# Patient Record
Sex: FEMALE | Race: WHITE | NOT HISPANIC OR LATINO | RURAL
[De-identification: names, ages, dates, MRNs, and addresses within clinical notes are randomized per-mention and may not be internally consistent; named-entity substitution may affect disease eponyms.]

---

## 2017-05-26 ENCOUNTER — TRANSCRIBE ORDERS (OUTPATIENT)
Dept: LAB | Facility: CLINIC | Age: 42
End: 2017-05-26

## 2017-05-26 DIAGNOSIS — E34.9 UNSPECIFIED ENDOCRINE DISORDER: ICD-10-CM

## 2017-05-26 DIAGNOSIS — E11.01 DIABETES MELLITUS TYPE 2 WITH HYPEROSMOLAR COMA, UNCONTROLLED, UNSPECIFIED LONG TERM INSULIN USE STATUS: ICD-10-CM

## 2017-05-26 DIAGNOSIS — R61 GENERALIZED HYPERHIDROSIS: Primary | ICD-10-CM

## 2017-05-30 ENCOUNTER — LAB (OUTPATIENT)
Dept: LAB | Facility: CLINIC | Age: 42
End: 2017-05-30

## 2017-05-30 DIAGNOSIS — E06.3 HASHIMOTO'S THYROIDITIS: ICD-10-CM

## 2017-05-30 DIAGNOSIS — E55.9 VITAMIN D DEFICIENCY: ICD-10-CM

## 2017-05-30 DIAGNOSIS — E34.9 UNSPECIFIED ENDOCRINE DISORDER: ICD-10-CM

## 2017-05-30 DIAGNOSIS — E06.3 HASHIMOTO'S DISEASE: ICD-10-CM

## 2017-05-30 DIAGNOSIS — E11.01 DIABETES MELLITUS TYPE 2 WITH HYPEROSMOLAR COMA, UNCONTROLLED, UNSPECIFIED LONG TERM INSULIN USE STATUS: ICD-10-CM

## 2017-05-30 DIAGNOSIS — R61 GENERALIZED HYPERHIDROSIS: ICD-10-CM

## 2017-05-30 LAB
25(OH)D3 SERPL-MCNC: 48.2 NG/ML (ref 30–100)
ALBUMIN SERPL-MCNC: 4 G/DL (ref 3.4–4.8)
ALBUMIN/GLOB SERPL: 1.2 G/DL (ref 1.1–1.8)
ALP SERPL-CCNC: 84 U/L (ref 38–126)
ALT SERPL W P-5'-P-CCNC: 33 U/L (ref 9–52)
ANION GAP SERPL CALCULATED.3IONS-SCNC: 11 MMOL/L (ref 5–15)
ARTICHOKE IGE QN: 144 MG/DL (ref 1–129)
AST SERPL-CCNC: 19 U/L (ref 14–36)
BASOPHILS # BLD AUTO: 0.04 10*3/MM3 (ref 0–0.2)
BASOPHILS NFR BLD AUTO: 0.4 % (ref 0–2)
BILIRUB SERPL-MCNC: 0.4 MG/DL (ref 0.2–1.3)
BUN BLD-MCNC: 18 MG/DL (ref 7–21)
BUN/CREAT SERPL: 31.6 (ref 7–25)
CALCIUM SPEC-SCNC: 9.1 MG/DL (ref 8.4–10.2)
CHLORIDE SERPL-SCNC: 100 MMOL/L (ref 95–110)
CHOLEST SERPL-MCNC: 185 MG/DL (ref 0–199)
CO2 SERPL-SCNC: 27 MMOL/L (ref 22–31)
CORTIS SERPL-MCNC: 6.05 MCG/DL (ref 4.46–22.7)
CREAT BLD-MCNC: 0.57 MG/DL (ref 0.5–1)
CRP SERPL-MCNC: 2 MG/DL (ref 0–1)
DEPRECATED RDW RBC AUTO: 43.9 FL (ref 36.4–46.3)
EOSINOPHIL # BLD AUTO: 0.29 10*3/MM3 (ref 0–0.7)
EOSINOPHIL NFR BLD AUTO: 3 % (ref 0–7)
ERYTHROCYTE [DISTWIDTH] IN BLOOD BY AUTOMATED COUNT: 14.7 % (ref 11.5–14.5)
FERRITIN SERPL-MCNC: 27.2 NG/ML (ref 6.2–137)
FOLATE SERPL-MCNC: 8.56 NG/ML (ref 2.76–21)
GFR SERPL CREATININE-BSD FRML MDRD: 117 ML/MIN/1.73 (ref 58–135)
GLOBULIN UR ELPH-MCNC: 3.4 GM/DL (ref 2.3–3.5)
GLUCOSE BLD-MCNC: 91 MG/DL (ref 60–100)
HBA1C MFR BLD: 5.77 % (ref 4–5.6)
HCT VFR BLD AUTO: 36.3 % (ref 35–45)
HDLC SERPL-MCNC: 33 MG/DL (ref 60–200)
HGB BLD-MCNC: 11.9 G/DL (ref 12–15.5)
IMM GRANULOCYTES # BLD: 0.01 10*3/MM3 (ref 0–0.02)
IMM GRANULOCYTES NFR BLD: 0.1 % (ref 0–0.5)
LDLC/HDLC SERPL: 4.13 {RATIO} (ref 0–3.22)
LITHIUM SERPL-SCNC: <0.2 MMOL/L (ref 0.6–1.2)
LYMPHOCYTES # BLD AUTO: 2.25 10*3/MM3 (ref 0.6–4.2)
LYMPHOCYTES NFR BLD AUTO: 23.2 % (ref 10–50)
MCH RBC QN AUTO: 26.9 PG (ref 26.5–34)
MCHC RBC AUTO-ENTMCNC: 32.8 G/DL (ref 31.4–36)
MCV RBC AUTO: 82.1 FL (ref 80–98)
MONOCYTES # BLD AUTO: 0.52 10*3/MM3 (ref 0–0.9)
MONOCYTES NFR BLD AUTO: 5.4 % (ref 0–12)
NEUTROPHILS # BLD AUTO: 6.57 10*3/MM3 (ref 2–8.6)
NEUTROPHILS NFR BLD AUTO: 67.9 % (ref 37–80)
PLATELET # BLD AUTO: 274 10*3/MM3 (ref 150–450)
PMV BLD AUTO: 12 FL (ref 8–12)
POTASSIUM BLD-SCNC: 4.2 MMOL/L (ref 3.5–5.1)
PROT SERPL-MCNC: 7.4 G/DL (ref 6.3–8.6)
RBC # BLD AUTO: 4.42 10*6/MM3 (ref 3.77–5.16)
SODIUM BLD-SCNC: 138 MMOL/L (ref 137–145)
T3 SERPL-MCNC: 290 NG/DL (ref 97–169)
T4 FREE SERPL-MCNC: 0.97 NG/DL (ref 0.78–2.19)
TRIGL SERPL-MCNC: 79 MG/DL (ref 20–199)
TSH SERPL DL<=0.05 MIU/L-ACNC: 0.06 MIU/ML (ref 0.46–4.68)
VIT B12 BLD-MCNC: 533 PG/ML (ref 239–931)
WBC NRBC COR # BLD: 9.68 10*3/MM3 (ref 3.2–9.8)

## 2017-05-30 PROCEDURE — 86644 CMV ANTIBODY: CPT | Performed by: NURSE PRACTITIONER

## 2017-05-30 PROCEDURE — 86140 C-REACTIVE PROTEIN: CPT | Performed by: NURSE PRACTITIONER

## 2017-05-30 PROCEDURE — 84436 ASSAY OF TOTAL THYROXINE: CPT | Performed by: CHIROPRACTOR

## 2017-05-30 PROCEDURE — 82978 ASSAY OF GLUTATHIONE: CPT | Performed by: CHIROPRACTOR

## 2017-05-30 PROCEDURE — 84479 ASSAY OF THYROID (T3 OR T4): CPT | Performed by: CHIROPRACTOR

## 2017-05-30 PROCEDURE — 86355 B CELLS TOTAL COUNT: CPT | Performed by: CHIROPRACTOR

## 2017-05-30 PROCEDURE — 84590 ASSAY OF VITAMIN A: CPT | Performed by: CHIROPRACTOR

## 2017-05-30 PROCEDURE — 84443 ASSAY THYROID STIM HORMONE: CPT | Performed by: NURSE PRACTITIONER

## 2017-05-30 PROCEDURE — 84999 UNLISTED CHEMISTRY PROCEDURE: CPT | Performed by: CHIROPRACTOR

## 2017-05-30 PROCEDURE — 86696 HERPES SIMPLEX TYPE 2 TEST: CPT | Performed by: CHIROPRACTOR

## 2017-05-30 PROCEDURE — 82728 ASSAY OF FERRITIN: CPT | Performed by: NURSE PRACTITIONER

## 2017-05-30 PROCEDURE — 80061 LIPID PANEL: CPT | Performed by: NURSE PRACTITIONER

## 2017-05-30 PROCEDURE — 83520 IMMUNOASSAY QUANT NOS NONAB: CPT | Performed by: NURSE PRACTITIONER

## 2017-05-30 PROCEDURE — 82306 VITAMIN D 25 HYDROXY: CPT | Performed by: NURSE PRACTITIONER

## 2017-05-30 PROCEDURE — 83036 HEMOGLOBIN GLYCOSYLATED A1C: CPT | Performed by: CHIROPRACTOR

## 2017-05-30 PROCEDURE — 86360 T CELL ABSOLUTE COUNT/RATIO: CPT | Performed by: CHIROPRACTOR

## 2017-05-30 PROCEDURE — 82607 VITAMIN B-12: CPT | Performed by: NURSE PRACTITIONER

## 2017-05-30 PROCEDURE — 84270 ASSAY OF SEX HORMONE GLOBUL: CPT | Performed by: NURSE PRACTITIONER

## 2017-05-30 PROCEDURE — 84480 ASSAY TRIIODOTHYRONINE (T3): CPT | Performed by: NURSE PRACTITIONER

## 2017-05-30 PROCEDURE — 82670 ASSAY OF TOTAL ESTRADIOL: CPT | Performed by: CHIROPRACTOR

## 2017-05-30 PROCEDURE — 83525 ASSAY OF INSULIN: CPT | Performed by: NURSE PRACTITIONER

## 2017-05-30 PROCEDURE — 86376 MICROSOMAL ANTIBODY EACH: CPT | Performed by: NURSE PRACTITIONER

## 2017-05-30 PROCEDURE — 84439 ASSAY OF FREE THYROXINE: CPT | Performed by: NURSE PRACTITIONER

## 2017-05-30 PROCEDURE — 82627 DEHYDROEPIANDROSTERONE: CPT | Performed by: NURSE PRACTITIONER

## 2017-05-30 PROCEDURE — 86695 HERPES SIMPLEX TYPE 1 TEST: CPT | Performed by: CHIROPRACTOR

## 2017-05-30 PROCEDURE — 84443 ASSAY THYROID STIM HORMONE: CPT | Performed by: CHIROPRACTOR

## 2017-05-30 PROCEDURE — 82679 ASSAY OF ESTRONE: CPT | Performed by: CHIROPRACTOR

## 2017-05-30 PROCEDURE — 84144 ASSAY OF PROGESTERONE: CPT | Performed by: NURSE PRACTITIONER

## 2017-05-30 PROCEDURE — 80053 COMPREHEN METABOLIC PANEL: CPT | Performed by: NURSE PRACTITIONER

## 2017-05-30 PROCEDURE — 83090 ASSAY OF HOMOCYSTEINE: CPT | Performed by: CHIROPRACTOR

## 2017-05-30 PROCEDURE — 85025 COMPLETE CBC W/AUTO DIFF WBC: CPT | Performed by: NURSE PRACTITIONER

## 2017-05-30 PROCEDURE — 82746 ASSAY OF FOLIC ACID SERUM: CPT | Performed by: NURSE PRACTITIONER

## 2017-05-30 PROCEDURE — 84442 ASSAY OF THYROID ACTIVITY: CPT | Performed by: CHIROPRACTOR

## 2017-05-30 PROCEDURE — 82677 ASSAY OF ESTRIOL: CPT | Performed by: CHIROPRACTOR

## 2017-05-30 PROCEDURE — 86357 NK CELLS TOTAL COUNT: CPT | Performed by: CHIROPRACTOR

## 2017-05-30 PROCEDURE — 82533 TOTAL CORTISOL: CPT | Performed by: NURSE PRACTITIONER

## 2017-05-30 PROCEDURE — 86665 EPSTEIN-BARR CAPSID VCA: CPT | Performed by: CHIROPRACTOR

## 2017-05-30 PROCEDURE — 86359 T CELLS TOTAL COUNT: CPT | Performed by: CHIROPRACTOR

## 2017-05-30 PROCEDURE — 80178 ASSAY OF LITHIUM: CPT | Performed by: CHIROPRACTOR

## 2017-05-31 LAB
BASOPHILS # BLD AUTO: 0.1 X10E3/UL (ref 0–0.2)
BASOPHILS NFR BLD AUTO: 1 %
CD19 CELLS # BLD: 319 /UL (ref 12–645)
CD19 CELLS NFR BLD: 14.5 % (ref 3.3–25.4)
CD3 CELLS # BLD: 1685 /UL (ref 622–2402)
CD3 CELLS NFR BLD: 76.6 % (ref 57.5–86.2)
CD3+CD16+CD56+ CELLS # BLD: 174 /UL (ref 24–406)
CD3+CD16+CD56+ CELLS NFR BLD: 7.9 % (ref 1.4–19.4)
CD3+CD4+ CELLS # BLD: 1250 /UL (ref 359–1519)
CD3+CD4+ CELLS NFR BLD: 56.8 % (ref 30.8–58.5)
CD3+CD4+ CELLS/CD3+CD8+ CLL BLD: 2.96 % (ref 0.92–3.72)
CD3+CD8+ CELLS # BLD: 422 /UL (ref 109–897)
CD3+CD8+ CELLS NFR BLD: 19.2 % (ref 12–35.5)
CMV IGG SERPL IA-ACNC: <0.6 U/ML (ref 0–0.59)
DHEA-S SERPL-MCNC: 280.5 UG/DL (ref 57.3–279.2)
EBV VCA IGG SER-ACNC: <18 U/ML (ref 0–17.9)
EOSINOPHIL # BLD AUTO: 0.3 X10E3/UL (ref 0–0.4)
EOSINOPHIL # BLD AUTO: 3 %
ERYTHROCYTE [DISTWIDTH] IN BLOOD BY AUTOMATED COUNT: 15 % (ref 12.3–15.4)
FT4I SERPL CALC-MCNC: 1.8 (ref 1.2–4.9)
HCT VFR BLD AUTO: 37.1 % (ref 34–46.6)
HCYS SERPL-SCNC: 7.1 UMOL/L (ref 0–15)
HGB BLD-MCNC: 12.1 G/DL (ref 11.1–15.9)
HSV1 IGG SER IA-ACNC: 9.26 INDEX (ref 0–0.9)
HSV2 IGG SER IA-ACNC: <0.91 INDEX (ref 0–0.9)
IMM GRANULOCYTES # BLD: 0 X10E3/UL (ref 0–0.1)
IMM GRANULOCYTES NFR BLD: 0 %
INSULIN SERPL-ACNC: 14.9 UIU/ML (ref 2.6–24.9)
LYMPHOCYTES # BLD AUTO: 2.2 X10E3/UL (ref 0.7–3.1)
LYMPHOCYTES NFR BLD AUTO: 23 %
MCH RBC QN AUTO: 26.9 PG (ref 26.6–33)
MCHC RBC AUTO-ENTMCNC: 32.6 G/DL (ref 31.5–35.7)
MCV RBC AUTO: 83 FL (ref 79–97)
MONOCYTES # BLD AUTO: 0.4 X10E3/UL (ref 0.1–0.9)
MONOCYTES NFR BLD AUTO: 4 %
NEUTROPHILS # BLD AUTO: 6.7 X10E3/UL (ref 1.4–7)
NEUTROPHILS NFR BLD AUTO: 69 %
PLATELET # BLD AUTO: 324 X10E3/UL (ref 150–379)
PROGEST SERPL-MCNC: 0.1 NG/ML
RBC # BLD AUTO: 4.49 X10E6/UL (ref 3.77–5.28)
SHBG SERPL-SCNC: 49.5 NMOL/L (ref 24.6–122)
T3RU NFR SERPL: 25 % (ref 24–39)
T4 SERPL-MCNC: 7 UG/DL (ref 4.5–12)
T4BG SERPL-MCNC: 28 UG/ML (ref 13–39)
THYROPEROXIDASE AB SERPL-ACNC: 84 IU/ML (ref 0–34)
TSH SERPL-ACNC: 0.15 UIU/ML (ref 0.45–4.5)
WBC # BLD AUTO: 9.7 X10E3/UL (ref 3.4–10.8)

## 2017-06-01 LAB
B19V IGG SER IA-ACNC: 0.3 INDEX (ref 0–0.8)
B19V IGM SER IA-ACNC: 0.2 INDEX (ref 0–0.8)
ESTRIOL SERPL-MCNC: <0.1 NG/ML
ESTRONE SERPL-MCNC: 152 PG/ML
HHV6 IGG SER QL: 7.94 INDEX
HHV6 IGM TITR SER IF: ABNORMAL {TITER}
LEPTIN SERPL-MCNC: 60.6 NG/ML

## 2017-06-02 LAB
TOTAL GLUTATHIONE: 312 UG/ML (ref 176–323)
VIT A SERPL-MCNC: 48 UG/DL (ref 20–65)

## 2017-06-05 ENCOUNTER — TELEPHONE (OUTPATIENT)
Dept: ENDOCRINOLOGY | Facility: CLINIC | Age: 42
End: 2017-06-05

## 2017-06-05 LAB
ESTRADIOL FREE MFR SERPL: 2.1 %
ESTRADIOL FREE SERPL-MCNC: 6.1 PG/ML
ESTRADIOL SERPL HS-MCNC: 289 PG/ML

## 2017-06-05 NOTE — TELEPHONE ENCOUNTER
----- Message from TOD Bender sent at 6/5/2017  7:50 AM CDT -----  She still needs to back off - I would take one every am but on Monday and Friday can still take two but all other days one tablet

## 2017-06-30 RX ORDER — THYROID,PORK 90 MG
TABLET ORAL
Qty: 60 TABLET | Refills: 0 | Status: SHIPPED | OUTPATIENT
Start: 2017-06-30 | End: 2017-08-24 | Stop reason: SDUPTHER

## 2017-08-24 ENCOUNTER — TELEPHONE (OUTPATIENT)
Dept: ENDOCRINOLOGY | Facility: CLINIC | Age: 42
End: 2017-08-24

## 2017-08-24 RX ORDER — LEVOTHYROXINE AND LIOTHYRONINE 57; 13.5 UG/1; UG/1
TABLET ORAL
Qty: 60 TABLET | Refills: 2 | Status: SHIPPED | OUTPATIENT
Start: 2017-08-24 | End: 2018-01-10 | Stop reason: SDUPTHER

## 2017-08-24 NOTE — TELEPHONE ENCOUNTER
----- Message from TOD Bender sent at 8/24/2017 10:02 AM CDT -----  Contact: [phone]  Give her enough to Oct then she needs appt with lab work   ----- Message -----     From: Leanne Ponce     Sent: 8/23/2017   4:06 PM       To: TOD Bender    PT IS GOING TO BE OUT OF HER THYROID MED ON Saturday. THE PROBLEM IS, SHE DOESN'T HAVE INSURANCE RIGHT NOW AND CAN'T AFFORD TO PAY TO BE SEEN. SHE SAID SHE CAN BARELY FIND ENOUGH MONEY FOR THE MED. SHE SAID SHE IS GOING BACK ON MEDICAID BUT IT WILL TAKE TIME. SHE IS ASKING FOR A REFILL ON HER THYROID MED.  [place] IN [place].

## 2018-01-10 RX ORDER — THYROID,PORK 90 MG
TABLET ORAL
Qty: 60 TABLET | Refills: 0 | Status: SHIPPED | OUTPATIENT
Start: 2018-01-10 | End: 2018-02-22 | Stop reason: SDUPTHER

## 2018-02-22 RX ORDER — THYROID,PORK 90 MG
TABLET ORAL
Qty: 60 TABLET | Refills: 0 | Status: SHIPPED | OUTPATIENT
Start: 2018-02-22 | End: 2018-03-21 | Stop reason: SDUPTHER

## 2018-03-22 RX ORDER — THYROID,PORK 90 MG
TABLET ORAL
Qty: 60 TABLET | Refills: 0 | Status: SHIPPED | OUTPATIENT
Start: 2018-03-22 | End: 2018-05-31 | Stop reason: SDUPTHER

## 2018-05-31 ENCOUNTER — TELEPHONE (OUTPATIENT)
Dept: FAMILY MEDICINE CLINIC | Facility: CLINIC | Age: 43
End: 2018-05-31

## 2018-05-31 RX ORDER — LEVOTHYROXINE AND LIOTHYRONINE 57; 13.5 UG/1; UG/1
TABLET ORAL
Qty: 60 TABLET | Refills: 2 | Status: SHIPPED | OUTPATIENT
Start: 2018-05-31 | End: 2018-06-02 | Stop reason: SDUPTHER

## 2018-05-31 NOTE — TELEPHONE ENCOUNTER
Patient has called and said she needs a refill sent to [facility] in [place]. She said she has 2 pills left. She doesn;t have insurance right now so she cant come in to see the DR. JENSEN THYROID 90 MG tablet please call her at [phone]

## 2018-06-01 ENCOUNTER — TELEPHONE (OUTPATIENT)
Dept: ENDOCRINOLOGY | Facility: CLINIC | Age: 43
End: 2018-06-01

## 2018-06-02 RX ORDER — LEVOTHYROXINE AND LIOTHYRONINE 57; 13.5 UG/1; UG/1
TABLET ORAL
Qty: 60 TABLET | Refills: 2 | Status: SHIPPED | OUTPATIENT
Start: 2018-06-02 | End: 2018-10-22 | Stop reason: SDUPTHER

## 2018-10-16 ENCOUNTER — TELEPHONE (OUTPATIENT)
Dept: ENDOCRINOLOGY | Facility: CLINIC | Age: 43
End: 2018-10-16

## 2018-10-16 ENCOUNTER — TELEPHONE (OUTPATIENT)
Dept: FAMILY MEDICINE CLINIC | Facility: CLINIC | Age: 43
End: 2018-10-16

## 2018-10-16 NOTE — TELEPHONE ENCOUNTER
Patient is needing refills on her Thyroid (ARMOUR THYROID) 90 MG PO tablet. She wants this sent to [facility]. Call her at [phone]

## 2018-10-22 ENCOUNTER — APPOINTMENT (OUTPATIENT)
Dept: LAB | Facility: HOSPITAL | Age: 43
End: 2018-10-22

## 2018-10-22 ENCOUNTER — TELEPHONE (OUTPATIENT)
Dept: ENDOCRINOLOGY | Facility: CLINIC | Age: 43
End: 2018-10-22

## 2018-10-22 ENCOUNTER — OFFICE VISIT (OUTPATIENT)
Dept: ENDOCRINOLOGY | Facility: CLINIC | Age: 43
End: 2018-10-22

## 2018-10-22 VITALS
SYSTOLIC BLOOD PRESSURE: 124 MMHG | HEART RATE: 82 BPM | DIASTOLIC BLOOD PRESSURE: 78 MMHG | WEIGHT: 244 LBS | HEIGHT: 59 IN | BODY MASS INDEX: 49.19 KG/M2

## 2018-10-22 DIAGNOSIS — N95.1 MENOPAUSAL SYMPTOM: ICD-10-CM

## 2018-10-22 DIAGNOSIS — E55.9 VITAMIN D DEFICIENCY: ICD-10-CM

## 2018-10-22 DIAGNOSIS — R73.01 IMPAIRED FASTING GLUCOSE: ICD-10-CM

## 2018-10-22 DIAGNOSIS — E06.3 HASHIMOTO'S DISEASE: Primary | ICD-10-CM

## 2018-10-22 LAB
25(OH)D3 SERPL-MCNC: 44.3 NG/ML (ref 30–100)
ALBUMIN SERPL-MCNC: 4.1 G/DL (ref 3.4–4.8)
ALBUMIN/GLOB SERPL: 1.1 G/DL (ref 1.1–1.8)
ALP SERPL-CCNC: 83 U/L (ref 38–126)
ALT SERPL W P-5'-P-CCNC: 26 U/L (ref 9–52)
ANION GAP SERPL CALCULATED.3IONS-SCNC: 11 MMOL/L (ref 5–15)
AST SERPL-CCNC: 35 U/L (ref 14–36)
BASOPHILS # BLD AUTO: 0.02 10*3/MM3 (ref 0–0.2)
BASOPHILS NFR BLD AUTO: 0.2 % (ref 0–2)
BILIRUB SERPL-MCNC: 0.3 MG/DL (ref 0.2–1.3)
BUN BLD-MCNC: 15 MG/DL (ref 7–21)
BUN/CREAT SERPL: 21.7 (ref 7–25)
CALCIUM SPEC-SCNC: 9.1 MG/DL (ref 8.4–10.2)
CHLORIDE SERPL-SCNC: 100 MMOL/L (ref 95–110)
CO2 SERPL-SCNC: 26 MMOL/L (ref 22–31)
CREAT BLD-MCNC: 0.69 MG/DL (ref 0.5–1)
DEPRECATED RDW RBC AUTO: 41.1 FL (ref 36.4–46.3)
EOSINOPHIL # BLD AUTO: 0.2 10*3/MM3 (ref 0–0.7)
EOSINOPHIL NFR BLD AUTO: 2.1 % (ref 0–7)
ERYTHROCYTE [DISTWIDTH] IN BLOOD BY AUTOMATED COUNT: 13.6 % (ref 11.5–14.5)
GFR SERPL CREATININE-BSD FRML MDRD: 93 ML/MIN/1.73 (ref 58–135)
GLOBULIN UR ELPH-MCNC: 3.8 GM/DL (ref 2.3–3.5)
GLUCOSE BLD-MCNC: 94 MG/DL (ref 60–100)
HBA1C MFR BLD: 6 % (ref 4–5.6)
HCT VFR BLD AUTO: 38.9 % (ref 35–45)
HGB BLD-MCNC: 13 G/DL (ref 12–15.5)
IMM GRANULOCYTES # BLD: 0.01 10*3/MM3 (ref 0–0.02)
IMM GRANULOCYTES NFR BLD: 0.1 % (ref 0–0.5)
LYMPHOCYTES # BLD AUTO: 2.12 10*3/MM3 (ref 0.6–4.2)
LYMPHOCYTES NFR BLD AUTO: 22.6 % (ref 10–50)
MCH RBC QN AUTO: 27.8 PG (ref 26.5–34)
MCHC RBC AUTO-ENTMCNC: 33.4 G/DL (ref 31.4–36)
MCV RBC AUTO: 83.3 FL (ref 80–98)
MONOCYTES # BLD AUTO: 0.4 10*3/MM3 (ref 0–0.9)
MONOCYTES NFR BLD AUTO: 4.3 % (ref 0–12)
NEUTROPHILS # BLD AUTO: 6.61 10*3/MM3 (ref 2–8.6)
NEUTROPHILS NFR BLD AUTO: 70.7 % (ref 37–80)
PLATELET # BLD AUTO: 335 10*3/MM3 (ref 150–450)
PMV BLD AUTO: 11.3 FL (ref 8–12)
POTASSIUM BLD-SCNC: 4.2 MMOL/L (ref 3.5–5.1)
PROT SERPL-MCNC: 7.9 G/DL (ref 6.3–8.6)
RBC # BLD AUTO: 4.67 10*6/MM3 (ref 3.77–5.16)
SODIUM BLD-SCNC: 137 MMOL/L (ref 137–145)
TSH SERPL DL<=0.05 MIU/L-ACNC: 21.2 MIU/ML (ref 0.46–4.68)
VIT B12 BLD-MCNC: 540 PG/ML (ref 239–931)
WBC NRBC COR # BLD: 9.36 10*3/MM3 (ref 3.2–9.8)

## 2018-10-22 PROCEDURE — 36415 COLL VENOUS BLD VENIPUNCTURE: CPT | Performed by: NURSE PRACTITIONER

## 2018-10-22 PROCEDURE — 82306 VITAMIN D 25 HYDROXY: CPT | Performed by: NURSE PRACTITIONER

## 2018-10-22 PROCEDURE — 83001 ASSAY OF GONADOTROPIN (FSH): CPT | Performed by: NURSE PRACTITIONER

## 2018-10-22 PROCEDURE — 85025 COMPLETE CBC W/AUTO DIFF WBC: CPT | Performed by: NURSE PRACTITIONER

## 2018-10-22 PROCEDURE — 84443 ASSAY THYROID STIM HORMONE: CPT | Performed by: NURSE PRACTITIONER

## 2018-10-22 PROCEDURE — 80053 COMPREHEN METABOLIC PANEL: CPT | Performed by: NURSE PRACTITIONER

## 2018-10-22 PROCEDURE — 83036 HEMOGLOBIN GLYCOSYLATED A1C: CPT | Performed by: NURSE PRACTITIONER

## 2018-10-22 PROCEDURE — 83002 ASSAY OF GONADOTROPIN (LH): CPT | Performed by: NURSE PRACTITIONER

## 2018-10-22 PROCEDURE — 82670 ASSAY OF TOTAL ESTRADIOL: CPT | Performed by: NURSE PRACTITIONER

## 2018-10-22 PROCEDURE — 82607 VITAMIN B-12: CPT | Performed by: NURSE PRACTITIONER

## 2018-10-22 PROCEDURE — 99214 OFFICE O/P EST MOD 30 MIN: CPT | Performed by: NURSE PRACTITIONER

## 2018-10-22 PROCEDURE — 84144 ASSAY OF PROGESTERONE: CPT | Performed by: NURSE PRACTITIONER

## 2018-10-22 RX ORDER — LEVOTHYROXINE AND LIOTHYRONINE 19; 4.5 UG/1; UG/1
TABLET ORAL
Qty: 30 TABLET | Refills: 5 | Status: SHIPPED | OUTPATIENT
Start: 2018-10-22 | End: 2019-05-03 | Stop reason: SDUPTHER

## 2018-10-22 RX ORDER — LEVOTHYROXINE AND LIOTHYRONINE 57; 13.5 UG/1; UG/1
TABLET ORAL
Qty: 30 TABLET | Refills: 5 | Status: SHIPPED | OUTPATIENT
Start: 2018-10-22 | End: 2019-05-03 | Stop reason: SDUPTHER

## 2018-10-22 NOTE — PROGRESS NOTES
Subjective    Sarah Romero is a 42 y.o. female. she is here today for follow-up.    History of Present Illness       Reason - hypothyroidism due to Hashimoto's     patient has not been in office since Oct. 2016     duration--diagnosed at age 16      timing - constant     quality - not controlled     severity - moderate     context--patient was having mood swings, headaches, fatigue and lab work revealed hypothyroidism     Quantity     May 19. 2016     TSH - 24.70     TPO ab positive    Lab Results   Component Value Date    TSH 0.060 (L) 05/30/2017    TSH 0.151 (L) 05/30/2017          symptoms--hair loss,weight gain, fatigue, hot flashes      alleviating factors--armour thyroid      aggravating factors--none           other labs     HgbA1c 5.8%         Evaluation history:  TSH   Date Value Ref Range Status   05/30/2017 0.060 (L) 0.460 - 4.680 mIU/mL Final   05/30/2017 0.151 (L) 0.450 - 4.500 uIU/mL Final     Free T4   Date Value Ref Range Status   05/30/2017 0.97 0.78 - 2.19 ng/dL Final     T3, Free   Date Value Ref Range Status   05/19/2016 2.6 2.0 - 4.4 pg/mL Final     Comment:     Performed at:  [facility]  [id number]  : Oswaldo Resendiz PhD, Phone:  [phone]         Current medications:  Current Outpatient Prescriptions   Medication Sig Dispense Refill   • Thyroid (ARMOUR THYROID) 90 MG PO tablet Take 1 tab po bid except on Sunday and Wednesday take only 1, armour thyroid only appt vivek Federico Romero for refills 60 tablet 2   • Thyroid, Pork, (BIO-THROID PO) Take 90 mg by mouth daily.       No current facility-administered medications for this visit.        The following portions of the patient's history were reviewed and updated as appropriate:   Past Medical History:   Diagnosis Date   • Abnormal glucose tolerance test during pregnancy, not yet delivered    • Abnormal weight gain    • Disorder of thyroid gland    • Dyslipidemia    • Fatigue    • Hashimoto's  thyroiditis    • Hypothyroidism     unspecified   • Other Obesity    • Patient currently pregnant     G5,P4      • Postpartum gestational diabetes mellitus     by hx      • Routine postpartum follow-up      Past Surgical History:   Procedure Laterality Date   •  SECTION      x2   • CHEST EXPLORATION     • CHOLECYSTECTOMY     • LUNG VOLUME REDUCTION      Treatment of collapsed lung (1)    • PAP SMEAR  2011    Negative   • TUBAL ABDOMINAL LIGATION  2011    Postpartum bilateral tubal ligation with Filshie Clips.   • VAGINAL BIRTH AFTER  SECTION      x2     Family History   Problem Relation Age of Onset   • Diabetes Other    • Heart disease Other    • Hypertension Other    • Stroke Other    • Other Other         Prob Metabolic Syndrome     OB History     No data available        Allergies   Allergen Reactions   • Morphine And Related Rash     Social History     Social History   • Marital status:      Social History Main Topics   • Smoking status: Never Smoker   • Smokeless tobacco: Never Used   • Alcohol use No   • Drug use: Unknown     Other Topics Concern   • Not on file       Review of Systems  Review of Systems   Constitutional: Negative for activity change, appetite change, diaphoresis and fatigue.   HENT: Negative for facial swelling, sneezing, sore throat, tinnitus, trouble swallowing and voice change.    Eyes: Negative for photophobia, pain, discharge, redness, itching and visual disturbance.   Respiratory: Negative for apnea, cough, choking, chest tightness and shortness of breath.    Cardiovascular: Negative for chest pain, palpitations and leg swelling.   Gastrointestinal: Negative for abdominal distention, abdominal pain, constipation, diarrhea, nausea and vomiting.   Endocrine: Negative for cold intolerance, heat intolerance, polydipsia, polyphagia and polyuria.   Genitourinary: Negative for difficulty urinating, dysuria, frequency, hematuria and urgency.  "  Musculoskeletal: Negative for arthralgias, back pain, gait problem, joint swelling, myalgias, neck pain and neck stiffness.   Skin: Negative for color change, pallor, rash and wound.   Neurological: Negative for dizziness, tremors, weakness, light-headedness, numbness and headaches.   Hematological: Negative for adenopathy. Does not bruise/bleed easily.   Psychiatric/Behavioral: Negative for behavioral problems, confusion and sleep disturbance.        Objective    /78 (BP Location: Left arm, Patient Position: Sitting, Cuff Size: Adult)   Pulse 82   Ht 149.9 cm (59\")   Wt 111 kg (244 lb)   BMI 49.28 kg/m²   Physical Exam   Constitutional: She is oriented to person, place, and time. She appears well-developed and well-nourished. No distress.   HENT:   Head: Normocephalic and atraumatic.   Right Ear: External ear normal.   Left Ear: External ear normal.   Nose: Nose normal.   Eyes: Pupils are equal, round, and reactive to light. Conjunctivae and EOM are normal.   Neck: Normal range of motion. Neck supple. No tracheal deviation present. No thyromegaly present.   Cardiovascular: Normal rate, regular rhythm and normal heart sounds.    No murmur heard.  Pulmonary/Chest: Effort normal and breath sounds normal. No respiratory distress. She has no wheezes.   Abdominal: Soft. Bowel sounds are normal. There is no tenderness. There is no rebound and no guarding.   Musculoskeletal: Normal range of motion. She exhibits no edema, tenderness or deformity.   Neurological: She is alert and oriented to person, place, and time. No cranial nerve deficit.   Skin: Skin is warm and dry. No rash noted.   Psychiatric: She has a normal mood and affect. Her behavior is normal. Judgment and thought content normal.       Lab Review  Lab Results   Component Value Date    TSH 0.060 (L) 05/30/2017    TSH 0.151 (L) 05/30/2017     Lab Results   Component Value Date    FREET4 0.97 05/30/2017        Assessment/Plan      1. Hashimoto's " disease    2. Vitamin D deficiency    3. Impaired fasting glucose    4. Menopausal symptom    . This diagnosis was discussed and reviewed with the patient including the advantages of drug therapy.     1. Orders placed during this encounter include:  Orders Placed This Encounter   Procedures   • Comprehensive Metabolic Panel   • Hemoglobin A1c   • Vitamin D 25 Hydroxy   • Vitamin B12   • TSH   • FSH & LH   • Estradiol   • Progesterone   • CBC & Differential     Order Specific Question:   Manual Differential     Answer:   No       Medications prescribed:  Outpatient Encounter Prescriptions as of 10/22/2018   Medication Sig Dispense Refill   • Thyroid (ARMOUR THYROID) 90 MG PO tablet Take 1 tab po bid except on Sunday and Wednesday take only 1, armour thyroid only appt vivek Romero for refills 60 tablet 2   • Thyroid, Pork, (BIO-THROID PO) Take 90 mg by mouth daily.       No facility-administered encounter medications on file as of 10/22/2018.      Glycemic Management  prediabetes     May 2016     Hgb A1c% 5.8     discussed weight loss, exercise       Preventive Care:  Patient is not smoking  Weight Related:  Obesity, Recommended weight loss, Discussed weight management strategies    Patient's Body mass index is 49.28 kg/m². BMI is above normal parameters. Recommendations include: educational material.        Other Diabetes Related Aspects  Hashimoto's thyroiditis         armour thyroid 90 mg one po bid -- except Sunday takes one tablet         due to uncontrolled hashimoto's will also check a celiac panel-- negative   complaints of weight gain and increased fatigue,. some facial hair ---celiac panel negative, adrenal workup negative      Sept 2016     TSH - 0.11     Keep the armour 90 bid -- but now on Wednesday only take one pill         Lab Results   Component Value Date    TSH 0.060 (L) 05/30/2017    TSH 0.151 (L) 05/30/2017       On armour 90 mg daily   Has been taking one a day for about 3 weeks due to  stretching medication        Sept 2016     Vit d - nl        Irregular menses , hot flashes          Labs today and I will call     4. Return in about 1 year (around 10/22/2019) for Recheck.

## 2018-10-22 NOTE — TELEPHONE ENCOUNTER
----- Message from TOD Bender sent at 10/22/2018  3:35 PM CDT -----  Her TSH is 21 keep the 90 in the am and do 30 mg in the pm , vitamin d and b12 were both normal; other labs still pending

## 2018-10-23 LAB
ESTRADIOL SERPL HS-MCNC: 39 PG/ML
PROGEST SERPL-MCNC: 0.1 NG/ML

## 2018-10-24 LAB
FSH SERPL-ACNC: 5.8 MIU/ML
LH SERPL-ACNC: 1.3 MIU/ML

## 2018-11-16 ENCOUNTER — TELEPHONE (OUTPATIENT)
Dept: ENDOCRINOLOGY | Facility: CLINIC | Age: 43
End: 2018-11-16

## 2018-11-16 NOTE — TELEPHONE ENCOUNTER
The FSH was 5.8 and generally is 30 or higher in menopausal women --if having abnormal periods it is probably due to her thyroid level being abnormal

## 2018-11-28 RX ORDER — LEVOTHYROXINE AND LIOTHYRONINE 57; 13.5 UG/1; UG/1
TABLET ORAL
Qty: 60 TABLET | Refills: 0 | Status: SHIPPED | OUTPATIENT
Start: 2018-11-28 | End: 2019-05-08 | Stop reason: SDUPTHER

## 2019-04-30 ENCOUNTER — TELEPHONE (OUTPATIENT)
Dept: FAMILY MEDICINE CLINIC | Facility: CLINIC | Age: 44
End: 2019-04-30

## 2019-04-30 DIAGNOSIS — E55.9 VITAMIN D DEFICIENCY: ICD-10-CM

## 2019-04-30 DIAGNOSIS — E06.3 HASHIMOTO'S DISEASE: Primary | ICD-10-CM

## 2019-04-30 DIAGNOSIS — R73.01 IMPAIRED FASTING GLUCOSE: ICD-10-CM

## 2019-05-01 ENCOUNTER — APPOINTMENT (OUTPATIENT)
Dept: LAB | Facility: HOSPITAL | Age: 44
End: 2019-05-01

## 2019-05-01 PROCEDURE — 82607 VITAMIN B-12: CPT | Performed by: NURSE PRACTITIONER

## 2019-05-01 PROCEDURE — 83036 HEMOGLOBIN GLYCOSYLATED A1C: CPT | Performed by: NURSE PRACTITIONER

## 2019-05-01 PROCEDURE — 85025 COMPLETE CBC W/AUTO DIFF WBC: CPT | Performed by: NURSE PRACTITIONER

## 2019-05-01 PROCEDURE — 82306 VITAMIN D 25 HYDROXY: CPT | Performed by: NURSE PRACTITIONER

## 2019-05-01 PROCEDURE — 80053 COMPREHEN METABOLIC PANEL: CPT | Performed by: NURSE PRACTITIONER

## 2019-05-01 PROCEDURE — 84443 ASSAY THYROID STIM HORMONE: CPT | Performed by: NURSE PRACTITIONER

## 2019-05-01 PROCEDURE — 84439 ASSAY OF FREE THYROXINE: CPT | Performed by: NURSE PRACTITIONER

## 2019-05-02 LAB
25(OH)D3 SERPL-MCNC: 33.9 NG/ML (ref 30–100)
ALBUMIN SERPL-MCNC: 4 G/DL (ref 3.5–5.2)
ALBUMIN/GLOB SERPL: 1.1 G/DL
ALP SERPL-CCNC: 72 U/L (ref 39–117)
ALT SERPL W P-5'-P-CCNC: 14 U/L (ref 1–33)
ANION GAP SERPL CALCULATED.3IONS-SCNC: 9.7 MMOL/L
AST SERPL-CCNC: 16 U/L (ref 1–32)
BASOPHILS # BLD AUTO: 0.08 10*3/MM3 (ref 0–0.2)
BASOPHILS NFR BLD AUTO: 0.8 % (ref 0–1.5)
BILIRUB SERPL-MCNC: 0.3 MG/DL (ref 0.2–1.2)
BUN BLD-MCNC: 10 MG/DL (ref 6–20)
BUN/CREAT SERPL: 17.2 (ref 7–25)
CALCIUM SPEC-SCNC: 9.4 MG/DL (ref 8.6–10.5)
CHLORIDE SERPL-SCNC: 100 MMOL/L (ref 98–107)
CO2 SERPL-SCNC: 27.3 MMOL/L (ref 22–29)
CREAT BLD-MCNC: 0.58 MG/DL (ref 0.57–1)
DEPRECATED RDW RBC AUTO: 43.7 FL (ref 37–54)
EOSINOPHIL # BLD AUTO: 0.27 10*3/MM3 (ref 0–0.4)
EOSINOPHIL NFR BLD AUTO: 2.7 % (ref 0.3–6.2)
ERYTHROCYTE [DISTWIDTH] IN BLOOD BY AUTOMATED COUNT: 13.8 % (ref 12.3–15.4)
GFR SERPL CREATININE-BSD FRML MDRD: 113 ML/MIN/1.73
GLOBULIN UR ELPH-MCNC: 3.5 GM/DL
GLUCOSE BLD-MCNC: 98 MG/DL (ref 65–99)
HBA1C MFR BLD: 6.01 % (ref 4.8–5.6)
HCT VFR BLD AUTO: 40.9 % (ref 34–46.6)
HGB BLD-MCNC: 12.8 G/DL (ref 12–15.9)
IMM GRANULOCYTES # BLD AUTO: 0.05 10*3/MM3 (ref 0–0.05)
IMM GRANULOCYTES NFR BLD AUTO: 0.5 % (ref 0–0.5)
LYMPHOCYTES # BLD AUTO: 2.41 10*3/MM3 (ref 0.7–3.1)
LYMPHOCYTES NFR BLD AUTO: 24.1 % (ref 19.6–45.3)
MCH RBC QN AUTO: 27.3 PG (ref 26.6–33)
MCHC RBC AUTO-ENTMCNC: 31.3 G/DL (ref 31.5–35.7)
MCV RBC AUTO: 87.2 FL (ref 79–97)
MONOCYTES # BLD AUTO: 0.5 10*3/MM3 (ref 0.1–0.9)
MONOCYTES NFR BLD AUTO: 5 % (ref 5–12)
NEUTROPHILS # BLD AUTO: 6.68 10*3/MM3 (ref 1.7–7)
NEUTROPHILS NFR BLD AUTO: 66.9 % (ref 42.7–76)
NRBC BLD AUTO-RTO: 0 /100 WBC (ref 0–0.2)
PLATELET # BLD AUTO: 348 10*3/MM3 (ref 140–450)
PMV BLD AUTO: 12.3 FL (ref 6–12)
POTASSIUM BLD-SCNC: 4.1 MMOL/L (ref 3.5–5.2)
PROT SERPL-MCNC: 7.5 G/DL (ref 6–8.5)
RBC # BLD AUTO: 4.69 10*6/MM3 (ref 3.77–5.28)
SODIUM BLD-SCNC: 137 MMOL/L (ref 136–145)
T4 FREE SERPL-MCNC: 0.84 NG/DL (ref 0.93–1.7)
TSH SERPL DL<=0.05 MIU/L-ACNC: 6.35 MIU/ML (ref 0.27–4.2)
VIT B12 BLD-MCNC: 473 PG/ML (ref 211–946)
WBC NRBC COR # BLD: 9.99 10*3/MM3 (ref 3.4–10.8)

## 2019-05-02 RX ORDER — THYROID,PORK 90 MG
TABLET ORAL
Qty: 30 TABLET | Refills: 5 | Status: CANCELLED | OUTPATIENT
Start: 2019-05-02

## 2019-05-03 RX ORDER — LEVOTHYROXINE AND LIOTHYRONINE 57; 13.5 UG/1; UG/1
TABLET ORAL
Qty: 30 TABLET | Refills: 1 | Status: SHIPPED | OUTPATIENT
Start: 2019-05-03 | End: 2019-05-08 | Stop reason: SDUPTHER

## 2019-05-03 RX ORDER — LEVOTHYROXINE AND LIOTHYRONINE 19; 4.5 UG/1; UG/1
TABLET ORAL
Qty: 30 TABLET | Refills: 1 | Status: SHIPPED | OUTPATIENT
Start: 2019-05-03 | End: 2019-05-08 | Stop reason: SDUPTHER

## 2019-05-08 ENCOUNTER — OFFICE VISIT (OUTPATIENT)
Dept: ENDOCRINOLOGY | Facility: CLINIC | Age: 44
End: 2019-05-08

## 2019-05-08 VITALS
DIASTOLIC BLOOD PRESSURE: 70 MMHG | WEIGHT: 247 LBS | BODY MASS INDEX: 49.8 KG/M2 | HEART RATE: 90 BPM | HEIGHT: 59 IN | SYSTOLIC BLOOD PRESSURE: 126 MMHG

## 2019-05-08 DIAGNOSIS — E66.01 CLASS 3 SEVERE OBESITY WITH BODY MASS INDEX (BMI) OF 45.0 TO 49.9 IN ADULT, UNSPECIFIED OBESITY TYPE, UNSPECIFIED WHETHER SERIOUS COMORBIDITY PRESENT (HCC): ICD-10-CM

## 2019-05-08 DIAGNOSIS — R73.01 IMPAIRED FASTING GLUCOSE: ICD-10-CM

## 2019-05-08 DIAGNOSIS — E55.9 VITAMIN D DEFICIENCY: ICD-10-CM

## 2019-05-08 DIAGNOSIS — E06.3 HASHIMOTO'S DISEASE: Primary | ICD-10-CM

## 2019-05-08 PROCEDURE — 99214 OFFICE O/P EST MOD 30 MIN: CPT | Performed by: NURSE PRACTITIONER

## 2019-05-08 RX ORDER — LEVOTHYROXINE AND LIOTHYRONINE 19; 4.5 UG/1; UG/1
TABLET ORAL
Qty: 30 TABLET | Refills: 1 | Status: SHIPPED | OUTPATIENT
Start: 2019-05-08 | End: 2019-09-03 | Stop reason: SDUPTHER

## 2019-05-08 RX ORDER — LEVOTHYROXINE AND LIOTHYRONINE 57; 13.5 UG/1; UG/1
TABLET ORAL
Qty: 30 TABLET | Refills: 1 | Status: SHIPPED | OUTPATIENT
Start: 2019-05-08 | End: 2019-09-03 | Stop reason: SDUPTHER

## 2019-05-08 NOTE — PROGRESS NOTES
Subjective    Sarah Romero is a 43 y.o. female. she is here today for follow-up.    History of Present Illness       Reason - hypothyroidism due to Hashimoto's      patient has not been in office since Oct. 2016     duration--diagnosed at age 16      timing - constant     quality - not controlled     severity - moderate     context--patient was having mood swings, headaches, fatigue and lab work revealed hypothyroidism     Quantity     May 19. 2016     TSH - 24.70     TPO ab positive      Lab Results   Component Value Date    TSH 6.350 (H) 2019                symptoms--hair loss,weight gain, fatigue, hot flashes      alleviating factors--armour thyroid      aggravating factors--none           other labs     HgbA1c 5.8%        The following portions of the patient's history were reviewed and updated as appropriate:   Past Medical History:   Diagnosis Date   • Abnormal glucose tolerance test during pregnancy, not yet delivered    • Abnormal weight gain    • Disorder of thyroid gland    • Dyslipidemia    • Fatigue    • Hashimoto's thyroiditis    • Hypothyroidism     unspecified   • Other Obesity    • Patient currently pregnant     G5,P4      • Postpartum gestational diabetes mellitus     by hx      • Routine postpartum follow-up      Past Surgical History:   Procedure Laterality Date   •  SECTION      x2   • CHEST EXPLORATION     • CHOLECYSTECTOMY     • LUNG VOLUME REDUCTION      Treatment of collapsed lung (1)    • PAP SMEAR  2011    Negative   • TUBAL ABDOMINAL LIGATION  2011    Postpartum bilateral tubal ligation with Filshie Clips.   • VAGINAL BIRTH AFTER  SECTION      x2     Family History   Problem Relation Age of Onset   • Diabetes Other    • Heart disease Other    • Hypertension Other    • Stroke Other    • Other Other         Prob Metabolic Syndrome     OB History     No data available        Current Outpatient Medications   Medication Sig Dispense Refill   • Thyroid  (MIKEY THYROID) 90 MG PO tablet ONE PO Q DAY IN THE AM 30 tablet 1   • Thyroid 30 MG PO tablet ONE PO Q DAY IN THE PM 30 tablet 1     No current facility-administered medications for this visit.      Allergies   Allergen Reactions   • Morphine And Related Rash     Social History     Socioeconomic History   • Marital status:      Spouse name: Not on file   • Number of children: Not on file   • Years of education: Not on file   • Highest education level: Not on file   Tobacco Use   • Smoking status: Never Smoker   • Smokeless tobacco: Never Used   Substance and Sexual Activity   • Alcohol use: No       Review of Systems  Review of Systems   Constitutional: Negative for activity change, appetite change, diaphoresis and fatigue.   HENT: Negative for facial swelling, sneezing, sore throat, tinnitus, trouble swallowing and voice change.    Eyes: Negative for photophobia, pain, discharge, redness, itching and visual disturbance.   Respiratory: Negative for apnea, cough, choking, chest tightness and shortness of breath.    Cardiovascular: Negative for chest pain, palpitations and leg swelling.   Gastrointestinal: Negative for abdominal distention, abdominal pain, constipation, diarrhea, nausea and vomiting.   Endocrine: Negative for cold intolerance, heat intolerance, polydipsia, polyphagia and polyuria.   Genitourinary: Negative for difficulty urinating, dysuria, frequency, hematuria and urgency.   Musculoskeletal: Negative for arthralgias, back pain, gait problem, joint swelling, myalgias, neck pain and neck stiffness.   Skin: Negative for color change, pallor, rash and wound.   Neurological: Negative for dizziness, tremors, weakness, light-headedness, numbness and headaches.   Hematological: Negative for adenopathy. Does not bruise/bleed easily.   Psychiatric/Behavioral: Negative for behavioral problems, confusion and sleep disturbance.        Objective    /70 (BP Location: Right arm, Patient Position:  "Sitting, Cuff Size: Adult)   Pulse 90   Ht 149.9 cm (59\")   Wt 112 kg (247 lb)   BMI 49.89 kg/m²   Physical Exam   Constitutional: She is oriented to person, place, and time. She appears well-developed and well-nourished. No distress.   HENT:   Head: Normocephalic and atraumatic.   Right Ear: External ear normal.   Left Ear: External ear normal.   Nose: Nose normal.   Eyes: Conjunctivae and EOM are normal. Pupils are equal, round, and reactive to light.   Neck: Normal range of motion. Neck supple. No tracheal deviation present. No thyromegaly present.   Cardiovascular: Normal rate, regular rhythm and normal heart sounds.   No murmur heard.  Pulmonary/Chest: Effort normal and breath sounds normal. No respiratory distress. She has no wheezes.   Abdominal: Soft. Bowel sounds are normal. There is no tenderness. There is no rebound and no guarding.   Musculoskeletal: Normal range of motion. She exhibits no edema, tenderness or deformity.   Neurological: She is alert and oriented to person, place, and time. No cranial nerve deficit.   Skin: Skin is warm and dry. No rash noted.   Psychiatric: She has a normal mood and affect. Her behavior is normal. Judgment and thought content normal.       Lab Review  Glucose (mg/dL)   Date Value   05/01/2019 98   10/22/2018 94   05/30/2017 91     Sodium (mmol/L)   Date Value   05/01/2019 137   10/22/2018 137   05/30/2017 138     Potassium (mmol/L)   Date Value   05/01/2019 4.1   10/22/2018 4.2   05/30/2017 4.2     Chloride (mmol/L)   Date Value   05/01/2019 100   10/22/2018 100   05/30/2017 100     CO2 (mmol/L)   Date Value   05/01/2019 27.3   10/22/2018 26.0   05/30/2017 27.0     BUN (mg/dL)   Date Value   05/01/2019 10   10/22/2018 15   05/30/2017 18     Creatinine (mg/dL)   Date Value   05/01/2019 0.58   10/22/2018 0.69   05/30/2017 0.57     Hemoglobin A1C   Date Value   05/01/2019 6.01 % (H)   10/22/2018 6.0 % (H)   05/30/2017 5.77 % (H)   05/19/2016 5.8 %TotHgb (H) "     Triglycerides   Date Value   05/30/2017 79 mg/dL   05/19/2016 154 mg/dl     LDL Cholesterol    Date Value   05/30/2017 144 mg/dL (H)   05/19/2016 140 mg/dl (H)   05/19/2016 140 mg/dl (H)       Assessment/Plan      1. Hashimoto's disease    2. Impaired fasting glucose    3. Vitamin D deficiency    4. Class 3 severe obesity with body mass index (BMI) of 45.0 to 49.9 in adult, unspecified obesity type, unspecified whether serious comorbidity present (CMS/[facility])    .    Medications prescribed:  Outpatient Encounter Medications as of 5/8/2019   Medication Sig Dispense Refill   • Thyroid (ARMOUR THYROID) 90 MG PO tablet ONE PO Q DAY IN THE AM 30 tablet 1   • Thyroid 30 MG PO tablet ONE PO Q DAY IN THE PM 30 tablet 1   • [DISCONTINUED] Thyroid (NP THYROID) 90 MG PO tablet TAKE 1 TABLET BY MOUTH TWICE DAILY; EXCEPT ON SUNDAY'S AND WEDNESDAY'S TAKE 1 TABLET BY MOUTH ONCE DAILY 60 tablet 0   • [DISCONTINUED] Thyroid, Pork, (BIO-THROID PO) Take 90 mg by mouth daily.       No facility-administered encounter medications on file as of 5/8/2019.        Orders placed during this encounter include:  Orders Placed This Encounter   Procedures   • T4, Free   • TSH     Glycemic Management  prediabetes           Lab Results   Component Value Date    HGBA1C 6.01 (H) 05/01/2019          discussed weight loss, exercise         Preventive Care:  Patient is not smoking        Weight Related:  Obesity, Recommended weight loss, Discussed weight management strategies     Patient's Body mass index is 49.89 kg/m². BMI is above normal parameters. Recommendations include: educational material.             Other Diabetes Related Aspects      Hashimoto's thyroiditis         armour thyroid 90 mg one po bid -- except Sunday takes one tablet         due to uncontrolled hashimoto's will also check a celiac panel-- negative   complaints of weight gain and increased fatigue,. some facial hair ---celiac panel negative, adrenal workup negative      Sept  2016     TSH - 0.11     Keep the armour 90 bid -- but now on Wednesday only take one pill                  Lab Results   Component Value Date     TSH 0.060 (L) 05/30/2017     TSH 0.151 (L) 05/30/2017         On armour 90 mg daily   Has been taking one a day for about 3 weeks due to stretching medication         Lab Results   Component Value Date    TSH 6.350 (H) 05/01/2019     Missing doses    No change please complaint      Bone Health     Vitamin d def.        Component      Latest Ref Rng & Units 5/1/2019   25 Hydroxy, Vitamin D      30.0 - 100.0 ng/ml 33.9     Lab Results   Component Value Date    [id number] 05/01/2019               4. Follow-up: No Follow-up on file.

## 2019-08-06 ENCOUNTER — APPOINTMENT (OUTPATIENT)
Dept: LAB | Facility: HOSPITAL | Age: 44
End: 2019-08-06

## 2019-08-06 PROCEDURE — 84439 ASSAY OF FREE THYROXINE: CPT | Performed by: NURSE PRACTITIONER

## 2019-08-06 PROCEDURE — 84443 ASSAY THYROID STIM HORMONE: CPT | Performed by: NURSE PRACTITIONER

## 2019-08-08 ENCOUNTER — TELEPHONE (OUTPATIENT)
Dept: ENDOCRINOLOGY | Facility: CLINIC | Age: 44
End: 2019-08-08

## 2019-08-08 LAB
T4 FREE SERPL-MCNC: 0.84 NG/DL (ref 0.93–1.7)
TSH SERPL DL<=0.05 MIU/L-ACNC: 2.49 MIU/ML (ref 0.27–4.2)

## 2019-08-08 NOTE — TELEPHONE ENCOUNTER
----- Message from TOD Bender sent at 8/8/2019  7:48 AM CDT -----  tsh is normal so no dosage change; t4 is low due to the armour thyroid that she takes

## 2019-08-12 ENCOUNTER — TELEPHONE (OUTPATIENT)
Dept: ENDOCRINOLOGY | Facility: CLINIC | Age: 44
End: 2019-08-12

## 2019-08-12 NOTE — TELEPHONE ENCOUNTER
Pt is returning your call about labs . Pt said not feeling well. Gained 10 poiunds in 2 weeks and seems inflamed.    Thank You    [phone]

## 2019-09-03 RX ORDER — LEVOTHYROXINE AND LIOTHYRONINE 57; 13.5 UG/1; UG/1
TABLET ORAL
Qty: 30 TABLET | Refills: 1 | Status: SHIPPED | OUTPATIENT
Start: 2019-09-03 | End: 2019-10-31 | Stop reason: SDUPTHER

## 2019-09-03 RX ORDER — LEVOTHYROXINE AND LIOTHYRONINE 19; 4.5 UG/1; UG/1
TABLET ORAL
Qty: 30 TABLET | Refills: 1 | Status: SHIPPED | OUTPATIENT
Start: 2019-09-03 | End: 2019-10-31 | Stop reason: SDUPTHER

## 2019-09-16 ENCOUNTER — TELEPHONE (OUTPATIENT)
Dept: FAMILY MEDICINE CLINIC | Facility: CLINIC | Age: 44
End: 2019-09-16

## 2019-09-17 ENCOUNTER — OFFICE VISIT (OUTPATIENT)
Dept: ENDOCRINOLOGY | Facility: CLINIC | Age: 44
End: 2019-09-17

## 2019-09-17 ENCOUNTER — APPOINTMENT (OUTPATIENT)
Dept: LAB | Facility: HOSPITAL | Age: 44
End: 2019-09-17

## 2019-09-17 VITALS
BODY MASS INDEX: 52.01 KG/M2 | SYSTOLIC BLOOD PRESSURE: 132 MMHG | DIASTOLIC BLOOD PRESSURE: 72 MMHG | HEIGHT: 59 IN | HEART RATE: 83 BPM | OXYGEN SATURATION: 98 % | WEIGHT: 258 LBS

## 2019-09-17 DIAGNOSIS — E06.3 HASHIMOTO'S DISEASE: Primary | ICD-10-CM

## 2019-09-17 DIAGNOSIS — R73.01 IMPAIRED FASTING GLUCOSE: ICD-10-CM

## 2019-09-17 DIAGNOSIS — E61.1 IRON DEFICIENCY: ICD-10-CM

## 2019-09-17 DIAGNOSIS — E55.9 VITAMIN D DEFICIENCY: ICD-10-CM

## 2019-09-17 PROCEDURE — 84466 ASSAY OF TRANSFERRIN: CPT | Performed by: NURSE PRACTITIONER

## 2019-09-17 PROCEDURE — 84439 ASSAY OF FREE THYROXINE: CPT | Performed by: NURSE PRACTITIONER

## 2019-09-17 PROCEDURE — 84443 ASSAY THYROID STIM HORMONE: CPT | Performed by: NURSE PRACTITIONER

## 2019-09-17 PROCEDURE — 82728 ASSAY OF FERRITIN: CPT | Performed by: NURSE PRACTITIONER

## 2019-09-17 PROCEDURE — 83540 ASSAY OF IRON: CPT | Performed by: NURSE PRACTITIONER

## 2019-09-17 PROCEDURE — 99214 OFFICE O/P EST MOD 30 MIN: CPT | Performed by: NURSE PRACTITIONER

## 2019-09-17 PROCEDURE — 84480 ASSAY TRIIODOTHYRONINE (T3): CPT | Performed by: NURSE PRACTITIONER

## 2019-09-17 PROCEDURE — 36415 COLL VENOUS BLD VENIPUNCTURE: CPT | Performed by: NURSE PRACTITIONER

## 2019-09-17 NOTE — PROGRESS NOTES
Subjective    Sarah Romero is a 43 y.o. female. she is here today for follow-up.    History of Present Illness       Reason - hypothyroidism due to Hashimoto's      patient has not been in office since Oct. 2016     duration--diagnosed at age 16      timing - constant     quality - not controlled     severity - moderate     context--patient was having mood swings, headaches, fatigue and lab work revealed hypothyroidism     Quantity     May 19. 2016     TSH - 24.70     TPO ab positive                   Lab Results   Component Value Date    TSH 2.490 08/06/2019          symptoms--hair loss,weight gain, fatigue, hot flashes      alleviating factors--armour thyroid      aggravating factors--none           other labs     HgbA1c 5.8%              Evaluation history:  TSH   Date Value Ref Range Status   08/06/2019 2.490 0.270 - 4.200 mIU/mL Final     Free T4   Date Value Ref Range Status   08/06/2019 0.84 (L) 0.93 - 1.70 ng/dL Final     T3, Free   Date Value Ref Range Status   05/19/2016 2.6 2.0 - 4.4 pg/mL Final     Comment:     Performed at:  [facility]  [id number]  : Oswaldo Resendiz PhD, Phone:  [phone]         Current medications:  Current Outpatient Medications   Medication Sig Dispense Refill   • Thyroid (ARMOUR THYROID) 30 MG PO tablet ONE BY MOUTH EVERY DAY IN THE NIGHT 30 tablet 1   • Thyroid (ARMOUR THYROID) 90 MG PO tablet ONE BY MOUTH EVERY DAY IN THE AM 30 tablet 1     No current facility-administered medications for this visit.        The following portions of the patient's history were reviewed and updated as appropriate:   Past Medical History:   Diagnosis Date   • Abnormal glucose tolerance test during pregnancy, not yet delivered    • Abnormal weight gain    • Disorder of thyroid gland    • Dyslipidemia    • Fatigue    • Hashimoto's thyroiditis    • Hypothyroidism     unspecified   • Other Obesity    • Patient currently pregnant     G5,P4      •  Postpartum gestational diabetes mellitus     by hx      • Routine postpartum follow-up      Past Surgical History:   Procedure Laterality Date   •  SECTION      x2   • CHEST EXPLORATION     • CHOLECYSTECTOMY     • LUNG VOLUME REDUCTION      Treatment of collapsed lung (1)    • PAP SMEAR  2011    Negative   • TUBAL ABDOMINAL LIGATION  2011    Postpartum bilateral tubal ligation with Filshie Clips.   • VAGINAL BIRTH AFTER  SECTION      x2     Family History   Problem Relation Age of Onset   • Diabetes Other    • Heart disease Other    • Hypertension Other    • Stroke Other    • Other Other         Prob Metabolic Syndrome     OB History     No data available        Allergies   Allergen Reactions   • Morphine And Related Rash     Social History     Socioeconomic History   • Marital status:      Spouse name: Not on file   • Number of children: Not on file   • Years of education: Not on file   • Highest education level: Not on file   Tobacco Use   • Smoking status: Never Smoker   • Smokeless tobacco: Never Used   Substance and Sexual Activity   • Alcohol use: No       Review of Systems  Review of Systems   Constitutional: Negative for activity change, appetite change, diaphoresis and fatigue.   HENT: Negative for facial swelling, sneezing, sore throat, tinnitus, trouble swallowing and voice change.    Eyes: Negative for photophobia, pain, discharge, redness, itching and visual disturbance.   Respiratory: Negative for apnea, cough, choking, chest tightness and shortness of breath.    Cardiovascular: Negative for chest pain, palpitations and leg swelling.   Gastrointestinal: Negative for abdominal distention, abdominal pain, constipation, diarrhea, nausea and vomiting.   Endocrine: Negative for cold intolerance, heat intolerance, polydipsia, polyphagia and polyuria.   Genitourinary: Negative for difficulty urinating, dysuria, frequency, hematuria and urgency.   Musculoskeletal: Negative  "for arthralgias, back pain, gait problem, joint swelling, myalgias, neck pain and neck stiffness.   Skin: Negative for color change, pallor, rash and wound.   Neurological: Negative for dizziness, tremors, weakness, light-headedness, numbness and headaches.   Hematological: Negative for adenopathy. Does not bruise/bleed easily.   Psychiatric/Behavioral: Negative for behavioral problems, confusion and sleep disturbance.        Objective    /72   Pulse 83   Ht 149.9 cm (59\")   Wt 117 kg (258 lb)   SpO2 98%   BMI 52.11 kg/m²   Physical Exam   Constitutional: She is oriented to person, place, and time. She appears well-developed and well-nourished. No distress.   HENT:   Head: Normocephalic and atraumatic.   Right Ear: External ear normal.   Left Ear: External ear normal.   Nose: Nose normal.   Eyes: Conjunctivae and EOM are normal. Pupils are equal, round, and reactive to light.   Neck: Normal range of motion. Neck supple. No tracheal deviation present. No thyromegaly present.   Cardiovascular: Normal rate, regular rhythm and normal heart sounds.   No murmur heard.  Pulmonary/Chest: Effort normal and breath sounds normal. No respiratory distress. She has no wheezes.   Abdominal: Soft. Bowel sounds are normal. There is no tenderness. There is no rebound and no guarding.   Musculoskeletal: Normal range of motion. She exhibits no edema, tenderness or deformity.   Neurological: She is alert and oriented to person, place, and time. No cranial nerve deficit.   Skin: Skin is warm and dry. No rash noted.   Psychiatric: She has a normal mood and affect. Her behavior is normal. Judgment and thought content normal.       Lab Review  Lab Results   Component Value Date    TSH 2.490 08/06/2019     Lab Results   Component Value Date    FREET4 0.84 (L) 08/06/2019        Assessment/Plan      1. Hashimoto's disease    2. Impaired fasting glucose    3. Vitamin D deficiency    4. Iron deficiency    . This diagnosis was " discussed and reviewed with the patient including the advantages of drug therapy.     1. Orders placed during this encounter include:  Orders Placed This Encounter   Procedures   • TSH   • T4, Free   • T3   • T3, Reverse   • Ferritin   • Iron Profile       Medications prescribed:  Outpatient Encounter Medications as of 9/17/2019   Medication Sig Dispense Refill   • Thyroid (ARMOUR THYROID) 30 MG PO tablet ONE BY MOUTH EVERY DAY IN THE NIGHT 30 tablet 1   • Thyroid (ARMOUR THYROID) 90 MG PO tablet ONE BY MOUTH EVERY DAY IN THE AM 30 tablet 1     No facility-administered encounter medications on file as of 9/17/2019.      Glycemic Management  prediabetes                    Lab Results   Component Value Date     HGBA1C 6.01 (H) 05/01/2019            discussed weight loss, exercise         Preventive Care:  Patient is not smoking           Weight Related:  Obesity, Recommended weight loss, Discussed weight management strategies     Patient's Body mass index is 49.89 kg/m². BMI is above normal parameters. Recommendations include: educational material.              Other Diabetes Related Aspects        Hashimoto's thyroiditis                due to uncontrolled hashimoto's will also check a celiac panel-- negative   complaints of weight gain and increased fatigue,. some facial hair ---celiac panel negative, adrenal workup negative           On armour 90 mg daily   Has been taking one a day for about 3 weeks due to stretching medication                  Lab Results   Component Value Date     TSH 6.350 (H) 05/01/2019      Missing doses     No change please complaint         Lab Results   Component Value Date    TSH 2.490 08/06/2019     Component      Latest Ref Rng & Units 8/6/2019   Free T4      0.93 - 1.70 ng/dL 0.84 (L)       Free T4 is low due to the armour          Bone Health      Vitamin d def.          Component      Latest Ref Rng & Units 5/1/2019   25 Hydroxy, Vitamin D      30.0 - 100.0 ng/ml 33.9       Lab  Results   Component Value Date    [id number] 05/01/2019                    4. Return in about 6 months (around 3/17/2020) for Recheck.

## 2019-09-18 LAB
FERRITIN SERPL-MCNC: 47.4 NG/ML (ref 13–150)
IRON 24H UR-MRATE: 53 MCG/DL (ref 37–145)
IRON SATN MFR SERPL: 13 % (ref 20–50)
T3 SERPL-MCNC: 136 NG/DL (ref 80–200)
T4 FREE SERPL-MCNC: 0.78 NG/DL (ref 0.93–1.7)
TIBC SERPL-MCNC: 401 MCG/DL (ref 298–536)
TRANSFERRIN SERPL-MCNC: 269 MG/DL (ref 200–360)
TSH SERPL DL<=0.05 MIU/L-ACNC: 4.06 UIU/ML (ref 0.27–4.2)

## 2019-09-19 ENCOUNTER — TELEPHONE (OUTPATIENT)
Dept: FAMILY MEDICINE CLINIC | Facility: CLINIC | Age: 44
End: 2019-09-19

## 2019-09-19 RX ORDER — LEVOTHYROXINE SODIUM 0.03 MG/1
TABLET ORAL
Qty: 15 TABLET | Refills: 11 | Status: SHIPPED | OUTPATIENT
Start: 2019-09-19 | End: 2020-08-24

## 2019-09-19 NOTE — TELEPHONE ENCOUNTER
Pt would like to go ahead and start Levothyroxine if Federico will call it in to [facility] in [place]. Thanks!

## 2019-09-19 NOTE — TELEPHONE ENCOUNTER
Federico,      If ok with you, can you please write RX..    (Pt would like to go ahead and start Levothyroxine if Federico will call it in to [facility] in [place]. Thanks!)

## 2019-10-31 RX ORDER — LEVOTHYROXINE AND LIOTHYRONINE 57; 13.5 UG/1; UG/1
TABLET ORAL
Qty: 30 TABLET | Refills: 5 | Status: SHIPPED | OUTPATIENT
Start: 2019-10-31 | End: 2020-04-07 | Stop reason: SDUPTHER

## 2019-10-31 RX ORDER — LEVOTHYROXINE AND LIOTHYRONINE 19; 4.5 UG/1; UG/1
TABLET ORAL
Qty: 30 TABLET | Refills: 1 | Status: SHIPPED | OUTPATIENT
Start: 2019-10-31 | End: 2020-01-06

## 2020-01-06 RX ORDER — LEVOTHYROXINE AND LIOTHYRONINE 19; 4.5 UG/1; UG/1
TABLET ORAL
Qty: 30 TABLET | Refills: 2 | Status: SHIPPED | OUTPATIENT
Start: 2020-01-06 | End: 2020-04-07 | Stop reason: SDUPTHER

## 2020-01-08 ENCOUNTER — TELEPHONE (OUTPATIENT)
Dept: FAMILY MEDICINE CLINIC | Facility: CLINIC | Age: 45
End: 2020-01-08

## 2020-01-08 NOTE — TELEPHONE ENCOUNTER
PATIENT CALLED AND STATES THAT SHE WAS NEEDING TO KNOW IF IT IS TIME FOR HER TO HAVE LABS DONE OR IF SHE WANTS TO WAIT UNTIL NEXT APPT? CALL HER [phone]

## 2020-03-17 ENCOUNTER — APPOINTMENT (OUTPATIENT)
Dept: LAB | Facility: HOSPITAL | Age: 45
End: 2020-03-17

## 2020-03-17 ENCOUNTER — OFFICE VISIT (OUTPATIENT)
Dept: ENDOCRINOLOGY | Facility: CLINIC | Age: 45
End: 2020-03-17

## 2020-03-17 VITALS
WEIGHT: 237.6 LBS | HEIGHT: 59 IN | HEART RATE: 83 BPM | BODY MASS INDEX: 47.9 KG/M2 | OXYGEN SATURATION: 98 % | DIASTOLIC BLOOD PRESSURE: 78 MMHG | SYSTOLIC BLOOD PRESSURE: 128 MMHG

## 2020-03-17 DIAGNOSIS — E06.3 HASHIMOTO'S DISEASE: Primary | ICD-10-CM

## 2020-03-17 DIAGNOSIS — E55.9 VITAMIN D DEFICIENCY: ICD-10-CM

## 2020-03-17 DIAGNOSIS — R73.01 IMPAIRED FASTING GLUCOSE: ICD-10-CM

## 2020-03-17 LAB
25(OH)D3 SERPL-MCNC: 38.4 NG/ML (ref 30–100)
ALBUMIN SERPL-MCNC: 4.3 G/DL (ref 3.5–5.2)
ALBUMIN/GLOB SERPL: 1.5 G/DL
ALP SERPL-CCNC: 79 U/L (ref 39–117)
ALT SERPL W P-5'-P-CCNC: 20 U/L (ref 1–33)
ANION GAP SERPL CALCULATED.3IONS-SCNC: 11 MMOL/L (ref 5–15)
AST SERPL-CCNC: 14 U/L (ref 1–32)
BASOPHILS # BLD AUTO: 0.07 10*3/MM3 (ref 0–0.2)
BASOPHILS NFR BLD AUTO: 0.7 % (ref 0–1.5)
BILIRUB SERPL-MCNC: 0.3 MG/DL (ref 0.2–1.2)
BUN BLD-MCNC: 14 MG/DL (ref 6–20)
BUN/CREAT SERPL: 22.2 (ref 7–25)
CALCIUM SPEC-SCNC: 9.1 MG/DL (ref 8.6–10.5)
CHLORIDE SERPL-SCNC: 103 MMOL/L (ref 98–107)
CO2 SERPL-SCNC: 25 MMOL/L (ref 22–29)
CREAT BLD-MCNC: 0.63 MG/DL (ref 0.57–1)
DEPRECATED RDW RBC AUTO: 40.9 FL (ref 37–54)
EOSINOPHIL # BLD AUTO: 0.21 10*3/MM3 (ref 0–0.4)
EOSINOPHIL NFR BLD AUTO: 2.1 % (ref 0.3–6.2)
ERYTHROCYTE [DISTWIDTH] IN BLOOD BY AUTOMATED COUNT: 14.3 % (ref 12.3–15.4)
GFR SERPL CREATININE-BSD FRML MDRD: 103 ML/MIN/1.73
GLOBULIN UR ELPH-MCNC: 2.8 GM/DL
GLUCOSE BLD-MCNC: 88 MG/DL (ref 65–99)
HBA1C MFR BLD: 5.9 % (ref 4.8–5.6)
HCT VFR BLD AUTO: 37.2 % (ref 34–46.6)
HGB BLD-MCNC: 12.4 G/DL (ref 12–15.9)
IMM GRANULOCYTES # BLD AUTO: 0.03 10*3/MM3 (ref 0–0.05)
IMM GRANULOCYTES NFR BLD AUTO: 0.3 % (ref 0–0.5)
LYMPHOCYTES # BLD AUTO: 2.38 10*3/MM3 (ref 0.7–3.1)
LYMPHOCYTES NFR BLD AUTO: 24.2 % (ref 19.6–45.3)
MCH RBC QN AUTO: 26.8 PG (ref 26.6–33)
MCHC RBC AUTO-ENTMCNC: 33.3 G/DL (ref 31.5–35.7)
MCV RBC AUTO: 80.3 FL (ref 79–97)
MONOCYTES # BLD AUTO: 0.57 10*3/MM3 (ref 0.1–0.9)
MONOCYTES NFR BLD AUTO: 5.8 % (ref 5–12)
NEUTROPHILS # BLD AUTO: 6.57 10*3/MM3 (ref 1.7–7)
NEUTROPHILS NFR BLD AUTO: 66.9 % (ref 42.7–76)
NRBC BLD AUTO-RTO: 0 /100 WBC (ref 0–0.2)
PLATELET # BLD AUTO: 387 10*3/MM3 (ref 140–450)
PMV BLD AUTO: 12 FL (ref 6–12)
POTASSIUM BLD-SCNC: 4.2 MMOL/L (ref 3.5–5.2)
PROT SERPL-MCNC: 7.1 G/DL (ref 6–8.5)
RBC # BLD AUTO: 4.63 10*6/MM3 (ref 3.77–5.28)
SODIUM BLD-SCNC: 139 MMOL/L (ref 136–145)
T4 FREE SERPL-MCNC: 1.07 NG/DL (ref 0.93–1.7)
TSH SERPL DL<=0.05 MIU/L-ACNC: 0.74 UIU/ML (ref 0.27–4.2)
VIT B12 BLD-MCNC: 1229 PG/ML (ref 211–946)
WBC NRBC COR # BLD: 9.83 10*3/MM3 (ref 3.4–10.8)

## 2020-03-17 PROCEDURE — 36415 COLL VENOUS BLD VENIPUNCTURE: CPT | Performed by: NURSE PRACTITIONER

## 2020-03-17 PROCEDURE — 83036 HEMOGLOBIN GLYCOSYLATED A1C: CPT | Performed by: NURSE PRACTITIONER

## 2020-03-17 PROCEDURE — 82607 VITAMIN B-12: CPT | Performed by: NURSE PRACTITIONER

## 2020-03-17 PROCEDURE — 84443 ASSAY THYROID STIM HORMONE: CPT | Performed by: NURSE PRACTITIONER

## 2020-03-17 PROCEDURE — 82306 VITAMIN D 25 HYDROXY: CPT | Performed by: NURSE PRACTITIONER

## 2020-03-17 PROCEDURE — 80053 COMPREHEN METABOLIC PANEL: CPT | Performed by: NURSE PRACTITIONER

## 2020-03-17 PROCEDURE — 84439 ASSAY OF FREE THYROXINE: CPT | Performed by: NURSE PRACTITIONER

## 2020-03-17 PROCEDURE — 99214 OFFICE O/P EST MOD 30 MIN: CPT | Performed by: NURSE PRACTITIONER

## 2020-03-17 PROCEDURE — 85025 COMPLETE CBC W/AUTO DIFF WBC: CPT | Performed by: NURSE PRACTITIONER

## 2020-03-17 NOTE — PROGRESS NOTES
Subjective    Sarah Romero is a 44 y.o. female. she is here today for follow-up.    History of Present Illness      Reason - hypothyroidism due to Hashimoto's      patient has not been in office since Oct. 2016     duration--diagnosed at age 16      timing - constant     quality - not controlled     severity - moderate     context--patient was having mood swings, headaches, fatigue and lab work revealed hypothyroidism     Quantity     TPO ab positive       Lab Results   Component Value Date    TSH 4.060 2019                  symptoms--hair loss,weight gain, fatigue, hot flashes      alleviating factors--armour thyroid , levothyroxine      aggravating factors--none           other labs     Lab Results   Component Value Date    HGBA1C 6.01 (H) 2019                         The following portions of the patient's history were reviewed and updated as appropriate:   Past Medical History:   Diagnosis Date   • Abnormal glucose tolerance test during pregnancy, not yet delivered    • Abnormal weight gain    • Disorder of thyroid gland    • Dyslipidemia    • Fatigue    • Hashimoto's thyroiditis    • Hypothyroidism     unspecified   • Other Obesity    • Patient currently pregnant     G5,P4      • Postpartum gestational diabetes mellitus     by hx      • Routine postpartum follow-up      Past Surgical History:   Procedure Laterality Date   •  SECTION      x2   • CHEST EXPLORATION     • CHOLECYSTECTOMY     • LUNG VOLUME REDUCTION      Treatment of collapsed lung (1)    • PAP SMEAR  2011    Negative   • TUBAL ABDOMINAL LIGATION  2011    Postpartum bilateral tubal ligation with Filshie Clips.   • VAGINAL BIRTH AFTER  SECTION      x2     Family History   Problem Relation Age of Onset   • Diabetes Other    • Heart disease Other    • Hypertension Other    • Stroke Other    • Other Other         Prob Metabolic Syndrome     OB History    None       Current Outpatient Medications   Medication  Sig Dispense Refill   • levothyroxine (SYNTHROID, LEVOTHROID) 25 MCG tablet Take 1/2 tablet po daily 15 tablet 11   • Thyroid (ARMOUR THYROID) 30 MG PO tablet TAKE 1 TABLET BY MOUTH AT NIGHT 30 tablet 2   • Thyroid (ARMOUR THYROID) 90 MG PO tablet TAKE 1 TABLET BY MOUTH EVERY MORNING 30 tablet 5     No current facility-administered medications for this visit.      Allergies   Allergen Reactions   • Morphine And Related Rash     Social History     Socioeconomic History   • Marital status:      Spouse name: Not on file   • Number of children: Not on file   • Years of education: Not on file   • Highest education level: Not on file   Tobacco Use   • Smoking status: Never Smoker   • Smokeless tobacco: Never Used   Substance and Sexual Activity   • Alcohol use: No       Review of Systems  Review of Systems   Constitutional: Negative for activity change, appetite change, diaphoresis and fatigue.   HENT: Negative for facial swelling, sneezing, sore throat, tinnitus, trouble swallowing and voice change.    Eyes: Negative for photophobia, pain, discharge, redness, itching and visual disturbance.   Respiratory: Negative for apnea, cough, choking, chest tightness and shortness of breath.    Cardiovascular: Negative for chest pain, palpitations and leg swelling.   Gastrointestinal: Negative for abdominal distention, abdominal pain, constipation, diarrhea, nausea and vomiting.   Endocrine: Negative for cold intolerance, heat intolerance, polydipsia, polyphagia and polyuria.   Genitourinary: Negative for difficulty urinating, dysuria, frequency, hematuria and urgency.   Musculoskeletal: Negative for arthralgias, back pain, gait problem, joint swelling, myalgias, neck pain and neck stiffness.   Skin: Negative for color change, pallor, rash and wound.   Neurological: Negative for dizziness, tremors, weakness, light-headedness, numbness and headaches.   Hematological: Negative for adenopathy. Does not bruise/bleed easily.  "  Psychiatric/Behavioral: Negative for behavioral problems, confusion and sleep disturbance.        Objective    /78   Pulse 83   Ht 149.9 cm (59\")   Wt 108 kg (237 lb 9.6 oz)   SpO2 98%   BMI 47.99 kg/m²   Physical Exam   Constitutional: She is oriented to person, place, and time. She appears well-developed and well-nourished. No distress.   HENT:   Head: Normocephalic and atraumatic.   Right Ear: External ear normal.   Left Ear: External ear normal.   Nose: Nose normal.   Eyes: Pupils are equal, round, and reactive to light. Conjunctivae and EOM are normal.   Neck: Normal range of motion. Neck supple. No tracheal deviation present. No thyromegaly present.   Cardiovascular: Normal rate, regular rhythm and normal heart sounds.   No murmur heard.  Pulmonary/Chest: Effort normal and breath sounds normal. No respiratory distress. She has no wheezes.   Abdominal: Soft. Bowel sounds are normal. There is no tenderness. There is no rebound and no guarding.   Musculoskeletal: Normal range of motion. She exhibits no edema, tenderness or deformity.   Neurological: She is alert and oriented to person, place, and time. No cranial nerve deficit.   Skin: Skin is warm and dry. No rash noted.   Psychiatric: She has a normal mood and affect. Her behavior is normal. Judgment and thought content normal.       Lab Review  Glucose (mg/dL)   Date Value   05/01/2019 98   10/22/2018 94   05/30/2017 91     Sodium (mmol/L)   Date Value   05/01/2019 137   10/22/2018 137   05/30/2017 138     Potassium (mmol/L)   Date Value   05/01/2019 4.1   10/22/2018 4.2   05/30/2017 4.2     Chloride (mmol/L)   Date Value   05/01/2019 100   10/22/2018 100   05/30/2017 100     CO2 (mmol/L)   Date Value   05/01/2019 27.3   10/22/2018 26.0   05/30/2017 27.0     BUN (mg/dL)   Date Value   05/01/2019 10   10/22/2018 15   05/30/2017 18     Creatinine (mg/dL)   Date Value   05/01/2019 0.58   10/22/2018 0.69   05/30/2017 0.57     Hemoglobin A1C   Date " Value   05/01/2019 6.01 % (H)   10/22/2018 6.0 % (H)   05/30/2017 5.77 % (H)   05/19/2016 5.8 %TotHgb (H)     Triglycerides   Date Value   05/30/2017 79 mg/dL   05/19/2016 154 mg/dl     LDL Cholesterol    Date Value   05/30/2017 144 mg/dL (H)   05/19/2016 140 mg/dl (H)   05/19/2016 140 mg/dl (H)       Assessment/Plan      1. Hashimoto's disease    2. Impaired fasting glucose    3. Vitamin D deficiency    .    Medications prescribed:  Outpatient Encounter Medications as of 3/17/2020   Medication Sig Dispense Refill   • levothyroxine (SYNTHROID, LEVOTHROID) 25 MCG tablet Take 1/2 tablet po daily 15 tablet 11   • Thyroid (ARMOUR THYROID) 30 MG PO tablet TAKE 1 TABLET BY MOUTH AT NIGHT 30 tablet 2   • Thyroid (ARMOUR THYROID) 90 MG PO tablet TAKE 1 TABLET BY MOUTH EVERY MORNING 30 tablet 5     No facility-administered encounter medications on file as of 3/17/2020.        Orders placed during this encounter include:  Orders Placed This Encounter   Procedures   • Comprehensive Metabolic Panel   • Hemoglobin A1c   • TSH   • Vitamin B12   • Vitamin D 25 Hydroxy   • T4, Free   • CBC & Differential     Order Specific Question:   Manual Differential     Answer:   No     Glycemic Management  prediabetes     Lab Results   Component Value Date    HGBA1C 6.01 (H) 05/01/2019                discussed weight loss, exercise         Preventive Care:  Patient is not smoking           Weight Related:  Obesity, Recommended weight loss, Discussed weight management strategies      Patient's Body mass index is 47.99 kg/m². BMI is above normal parameters. Recommendations include: nutrition counseling.      Decrease daily caloric intake by 500 calories per day                   Other Diabetes Related Aspects        Hashimoto's thyroiditis                 due to uncontrolled hashimoto's will also check a celiac panel-- negative   complaints of weight gain and increased fatigue,. some facial hair ---celiac panel negative, adrenal workup negative             Component      Latest Ref Rng & Units 9/17/2019   TSH Baseline      0.270 - 4.200 uIU/mL 4.060   Free T4      0.93 - 1.70 ng/dL 0.78 (L)   T3, Total      80.0 - 200.0 ng/dl 136.0           Taking armour 90 mg in the am and 30 mg in the pm    Taking levothyroxine 25 mcg once daily            Bone Health      Vitamin d def.       Component      Latest Ref Rng & Units 5/1/2019   25 Hydroxy, Vitamin D      30.0 - 100.0 ng/ml 33.9           Lab Results   Component Value Date    [id number] 05/01/2019                       4. Follow-up: Return in about 6 months (around 9/17/2020).

## 2020-04-07 ENCOUNTER — TELEPHONE (OUTPATIENT)
Dept: ENDOCRINOLOGY | Facility: CLINIC | Age: 45
End: 2020-04-07

## 2020-04-07 DIAGNOSIS — E06.3 HASHIMOTO'S DISEASE: Primary | ICD-10-CM

## 2020-04-07 RX ORDER — LEVOTHYROXINE AND LIOTHYRONINE 57; 13.5 UG/1; UG/1
TABLET ORAL
Qty: 30 TABLET | Refills: 5 | Status: SHIPPED | OUTPATIENT
Start: 2020-04-07 | End: 2020-10-22

## 2020-04-07 RX ORDER — LEVOTHYROXINE AND LIOTHYRONINE 19; 4.5 UG/1; UG/1
TABLET ORAL
Qty: 30 TABLET | Refills: 2 | Status: SHIPPED | OUTPATIENT
Start: 2020-04-07 | End: 2020-08-24

## 2020-08-24 DIAGNOSIS — E06.3 HASHIMOTO'S DISEASE: ICD-10-CM

## 2020-08-24 RX ORDER — LEVOTHYROXINE AND LIOTHYRONINE 19; 4.5 UG/1; UG/1
TABLET ORAL
Qty: 30 TABLET | Refills: 2 | Status: SHIPPED | OUTPATIENT
Start: 2020-08-24 | End: 2020-12-14

## 2020-08-24 RX ORDER — LEVOTHYROXINE SODIUM 0.03 MG/1
TABLET ORAL
Qty: 15 TABLET | Refills: 11 | Status: SHIPPED | OUTPATIENT
Start: 2020-08-24

## 2020-09-17 ENCOUNTER — LAB (OUTPATIENT)
Dept: LAB | Facility: HOSPITAL | Age: 45
End: 2020-09-17

## 2020-09-17 ENCOUNTER — OFFICE VISIT (OUTPATIENT)
Dept: ENDOCRINOLOGY | Facility: CLINIC | Age: 45
End: 2020-09-17

## 2020-09-17 VITALS
WEIGHT: 253.6 LBS | HEIGHT: 57 IN | SYSTOLIC BLOOD PRESSURE: 120 MMHG | BODY MASS INDEX: 54.71 KG/M2 | HEART RATE: 92 BPM | DIASTOLIC BLOOD PRESSURE: 62 MMHG | OXYGEN SATURATION: 100 %

## 2020-09-17 DIAGNOSIS — E61.1 IRON DEFICIENCY: ICD-10-CM

## 2020-09-17 DIAGNOSIS — E55.9 VITAMIN D DEFICIENCY: Primary | ICD-10-CM

## 2020-09-17 DIAGNOSIS — E06.3 HASHIMOTO'S DISEASE: ICD-10-CM

## 2020-09-17 DIAGNOSIS — E60 ZINC DEFICIENCY: ICD-10-CM

## 2020-09-17 DIAGNOSIS — R73.01 IMPAIRED FASTING GLUCOSE: ICD-10-CM

## 2020-09-17 LAB
25(OH)D3 SERPL-MCNC: 43.9 NG/ML (ref 30–100)
ALBUMIN SERPL-MCNC: 4.1 G/DL (ref 3.5–5.2)
ALBUMIN/GLOB SERPL: 1.1 G/DL
ALP SERPL-CCNC: 84 U/L (ref 39–117)
ALT SERPL W P-5'-P-CCNC: 23 U/L (ref 1–33)
ANION GAP SERPL CALCULATED.3IONS-SCNC: 11.3 MMOL/L (ref 5–15)
AST SERPL-CCNC: 16 U/L (ref 1–32)
BASOPHILS # BLD AUTO: 0.07 10*3/MM3 (ref 0–0.2)
BASOPHILS NFR BLD AUTO: 0.7 % (ref 0–1.5)
BILIRUB SERPL-MCNC: 0.3 MG/DL (ref 0–1.2)
BUN SERPL-MCNC: 14 MG/DL (ref 6–20)
BUN/CREAT SERPL: 25.5 (ref 7–25)
CALCIUM SPEC-SCNC: 10 MG/DL (ref 8.6–10.5)
CHLORIDE SERPL-SCNC: 101 MMOL/L (ref 98–107)
CO2 SERPL-SCNC: 25.7 MMOL/L (ref 22–29)
CREAT SERPL-MCNC: 0.55 MG/DL (ref 0.57–1)
DEPRECATED RDW RBC AUTO: 42.1 FL (ref 37–54)
EOSINOPHIL # BLD AUTO: 0.19 10*3/MM3 (ref 0–0.4)
EOSINOPHIL NFR BLD AUTO: 1.9 % (ref 0.3–6.2)
ERYTHROCYTE [DISTWIDTH] IN BLOOD BY AUTOMATED COUNT: 13.9 % (ref 12.3–15.4)
GFR SERPL CREATININE-BSD FRML MDRD: 120 ML/MIN/1.73
GLOBULIN UR ELPH-MCNC: 3.9 GM/DL
GLUCOSE SERPL-MCNC: 97 MG/DL (ref 65–99)
HBA1C MFR BLD: 6.23 % (ref 4.8–5.6)
HCT VFR BLD AUTO: 39.3 % (ref 34–46.6)
HGB BLD-MCNC: 13 G/DL (ref 12–15.9)
IMM GRANULOCYTES # BLD AUTO: 0.02 10*3/MM3 (ref 0–0.05)
IMM GRANULOCYTES NFR BLD AUTO: 0.2 % (ref 0–0.5)
IRON 24H UR-MRATE: 65 MCG/DL (ref 37–145)
IRON SATN MFR SERPL: 16 % (ref 20–50)
LYMPHOCYTES # BLD AUTO: 2.19 10*3/MM3 (ref 0.7–3.1)
LYMPHOCYTES NFR BLD AUTO: 22.4 % (ref 19.6–45.3)
MCH RBC QN AUTO: 27.6 PG (ref 26.6–33)
MCHC RBC AUTO-ENTMCNC: 33.1 G/DL (ref 31.5–35.7)
MCV RBC AUTO: 83.4 FL (ref 79–97)
MONOCYTES # BLD AUTO: 0.52 10*3/MM3 (ref 0.1–0.9)
MONOCYTES NFR BLD AUTO: 5.3 % (ref 5–12)
NEUTROPHILS NFR BLD AUTO: 6.8 10*3/MM3 (ref 1.7–7)
NEUTROPHILS NFR BLD AUTO: 69.5 % (ref 42.7–76)
NRBC BLD AUTO-RTO: 0 /100 WBC (ref 0–0.2)
PLATELET # BLD AUTO: 352 10*3/MM3 (ref 140–450)
PMV BLD AUTO: 11.3 FL (ref 6–12)
POTASSIUM SERPL-SCNC: 4.4 MMOL/L (ref 3.5–5.2)
PROT SERPL-MCNC: 8 G/DL (ref 6–8.5)
RBC # BLD AUTO: 4.71 10*6/MM3 (ref 3.77–5.28)
SODIUM SERPL-SCNC: 138 MMOL/L (ref 136–145)
T3 SERPL-MCNC: 193 NG/DL (ref 80–200)
T4 FREE SERPL-MCNC: 0.97 NG/DL (ref 0.93–1.7)
TIBC SERPL-MCNC: 407 MCG/DL (ref 298–536)
TRANSFERRIN SERPL-MCNC: 273 MG/DL (ref 200–360)
TSH SERPL DL<=0.05 MIU/L-ACNC: 2.86 UIU/ML (ref 0.27–4.2)
VIT B12 BLD-MCNC: 909 PG/ML (ref 211–946)
WBC # BLD AUTO: 9.79 10*3/MM3 (ref 3.4–10.8)

## 2020-09-17 PROCEDURE — 84439 ASSAY OF FREE THYROXINE: CPT | Performed by: NURSE PRACTITIONER

## 2020-09-17 PROCEDURE — 36415 COLL VENOUS BLD VENIPUNCTURE: CPT | Performed by: NURSE PRACTITIONER

## 2020-09-17 PROCEDURE — 99214 OFFICE O/P EST MOD 30 MIN: CPT | Performed by: NURSE PRACTITIONER

## 2020-09-17 PROCEDURE — 84480 ASSAY TRIIODOTHYRONINE (T3): CPT | Performed by: NURSE PRACTITIONER

## 2020-09-17 PROCEDURE — 84482 T3 REVERSE: CPT | Performed by: NURSE PRACTITIONER

## 2020-09-17 PROCEDURE — 84443 ASSAY THYROID STIM HORMONE: CPT | Performed by: NURSE PRACTITIONER

## 2020-09-17 PROCEDURE — 80053 COMPREHEN METABOLIC PANEL: CPT | Performed by: NURSE PRACTITIONER

## 2020-09-17 PROCEDURE — 83036 HEMOGLOBIN GLYCOSYLATED A1C: CPT | Performed by: NURSE PRACTITIONER

## 2020-09-17 PROCEDURE — 86376 MICROSOMAL ANTIBODY EACH: CPT | Performed by: NURSE PRACTITIONER

## 2020-09-17 PROCEDURE — 83540 ASSAY OF IRON: CPT | Performed by: NURSE PRACTITIONER

## 2020-09-17 PROCEDURE — 84630 ASSAY OF ZINC: CPT | Performed by: NURSE PRACTITIONER

## 2020-09-17 PROCEDURE — 84466 ASSAY OF TRANSFERRIN: CPT | Performed by: NURSE PRACTITIONER

## 2020-09-17 PROCEDURE — 82607 VITAMIN B-12: CPT | Performed by: NURSE PRACTITIONER

## 2020-09-17 PROCEDURE — 85025 COMPLETE CBC W/AUTO DIFF WBC: CPT | Performed by: NURSE PRACTITIONER

## 2020-09-17 PROCEDURE — 82306 VITAMIN D 25 HYDROXY: CPT | Performed by: NURSE PRACTITIONER

## 2020-09-17 NOTE — PROGRESS NOTES
Subjective    Sarah Romero is a 44 y.o. female. she is here today for follow-up.    History of Present Illness     In office visit       Reason - hypothyroidism due to Hashimoto's      patient has not been in office since Oct. 2016     duration--diagnosed at age 16      timing - constant     quality - not controlled     severity - moderate     context--patient was having mood swings, headaches, fatigue and lab work revealed hypothyroidism     Quantity     TPO ab positive               Lab Results   Component Value Date     TSH 4.060 09/17/2019            Lab Results   Component Value Date    TSH 0.742 03/17/2020                symptoms--hair loss,weight gain, fatigue, hot flashes      alleviating factors--armour thyroid , levothyroxine      aggravating factors--none           other labs     Lab Results   Component Value Date    HGBA1C 5.90 (H) 03/17/2020               Evaluation history:  TSH   Date Value Ref Range Status   03/17/2020 0.742 0.270 - 4.200 uIU/mL Final     Free T4   Date Value Ref Range Status   03/17/2020 1.07 0.93 - 1.70 ng/dL Final     T3, Free   Date Value Ref Range Status   05/19/2016 2.6 2.0 - 4.4 pg/mL Final     Comment:     Performed at:  [facility]  [id number]  : Oswaldo Resendiz PhD, Phone:  [phone]         Current medications:  Current Outpatient Medications   Medication Sig Dispense Refill   • levothyroxine (SYNTHROID, LEVOTHROID) 25 MCG tablet TAKE 1/2 TABLET BY MOUTH DAILY 15 tablet 11   • Thyroid (ARMOUR THYROID) 30 MG PO tablet TAKE 1 TABLET BY MOUTH AT NIGHT 30 tablet 2   • Thyroid (ARMOUR THYROID) 90 MG PO tablet TAKE 1 TABLET BY MOUTH EVERY MORNING 30 tablet 5     No current facility-administered medications for this visit.        The following portions of the patient's history were reviewed and updated as appropriate:   Past Medical History:   Diagnosis Date   • Abnormal glucose tolerance test during pregnancy, not yet  delivered    • Abnormal weight gain    • Disorder of thyroid gland    • Dyslipidemia    • Fatigue    • Hashimoto's thyroiditis    • Hypothyroidism     unspecified   • Other Obesity    • Patient currently pregnant     G5,P4      • Postpartum gestational diabetes mellitus     by hx      • Routine postpartum follow-up      Past Surgical History:   Procedure Laterality Date   •  SECTION      x2   • CHEST EXPLORATION     • CHOLECYSTECTOMY     • LUNG VOLUME REDUCTION      Treatment of collapsed lung (1)    • PAP SMEAR  2011    Negative   • TUBAL ABDOMINAL LIGATION  2011    Postpartum bilateral tubal ligation with Filshie Clips.   • VAGINAL BIRTH AFTER  SECTION      x2     Family History   Problem Relation Age of Onset   • Diabetes Other    • Heart disease Other    • Hypertension Other    • Stroke Other    • Other Other         Prob Metabolic Syndrome     OB History    No obstetric history on file.       Allergies   Allergen Reactions   • Morphine And Related Rash     Social History     Socioeconomic History   • Marital status:      Spouse name: Not on file   • Number of children: Not on file   • Years of education: Not on file   • Highest education level: Not on file   Tobacco Use   • Smoking status: Never Smoker   • Smokeless tobacco: Never Used   Substance and Sexual Activity   • Alcohol use: No       Review of Systems  Review of Systems   Constitutional: Negative for activity change, appetite change, diaphoresis and fatigue.   HENT: Negative for facial swelling, sneezing, sore throat, tinnitus, trouble swallowing and voice change.    Eyes: Negative for photophobia, pain, discharge, redness, itching and visual disturbance.   Respiratory: Negative for apnea, cough, choking, chest tightness and shortness of breath.    Cardiovascular: Negative for chest pain, palpitations and leg swelling.   Gastrointestinal: Negative for abdominal distention, abdominal pain, constipation, diarrhea,  "nausea and vomiting.   Endocrine: Negative for cold intolerance, heat intolerance, polydipsia, polyphagia and polyuria.   Genitourinary: Negative for difficulty urinating, dysuria, frequency, hematuria and urgency.   Musculoskeletal: Negative for arthralgias, back pain, gait problem, joint swelling, myalgias, neck pain and neck stiffness.   Skin: Negative for color change, pallor, rash and wound.   Neurological: Negative for dizziness, tremors, weakness, light-headedness, numbness and headaches.   Hematological: Negative for adenopathy. Does not bruise/bleed easily.   Psychiatric/Behavioral: Negative for behavioral problems, confusion and sleep disturbance.        Objective    /62   Pulse 92   Ht 144.8 cm (57\")   Wt 115 kg (253 lb 9.6 oz)   SpO2 100%   BMI 54.88 kg/m²   Physical Exam  Constitutional:       General: She is not in acute distress.     Appearance: She is well-developed.   HENT:      Head: Normocephalic and atraumatic.      Right Ear: External ear normal.      Left Ear: External ear normal.      Nose: Nose normal.   Eyes:      Conjunctiva/sclera: Conjunctivae normal.      Pupils: Pupils are equal, round, and reactive to light.   Neck:      Musculoskeletal: Normal range of motion and neck supple.      Thyroid: No thyromegaly.      Trachea: No tracheal deviation.   Cardiovascular:      Rate and Rhythm: Normal rate and regular rhythm.      Heart sounds: Normal heart sounds. No murmur.   Pulmonary:      Effort: Pulmonary effort is normal. No respiratory distress.      Breath sounds: Normal breath sounds. No wheezing.   Abdominal:      General: Bowel sounds are normal.      Palpations: Abdomen is soft.      Tenderness: There is no abdominal tenderness. There is no guarding or rebound.   Musculoskeletal: Normal range of motion.         General: No tenderness or deformity.   Skin:     General: Skin is warm and dry.      Findings: No rash.   Neurological:      Mental Status: She is alert and oriented " to person, place, and time.      Cranial Nerves: No cranial nerve deficit.   Psychiatric:         Behavior: Behavior normal.         Thought Content: Thought content normal.         Judgment: Judgment normal.         Lab Review  TSH   Date Value Ref Range Status   03/17/2020 0.742 0.270 - 4.200 uIU/mL Final     Free T4   Date Value Ref Range Status   03/17/2020 1.07 0.93 - 1.70 ng/dL Final        Assessment/Plan      1. Vitamin D deficiency    2. Hashimoto's disease    3. Impaired fasting glucose    4. Iron deficiency    5. Zinc deficiency    . This diagnosis was discussed and reviewed with the patient including the advantages of drug therapy.     1. Orders placed during this encounter include:  Orders Placed This Encounter   Procedures   • Comprehensive Metabolic Panel   • Hemoglobin A1c   • TSH   • Vitamin B12   • Vitamin D 25 Hydroxy   • T4, Free   • T3   • T3, Reverse   • Thyroid Peroxidase Antibody   • Iron Profile   • Zinc   • CBC & Differential     Order Specific Question:   Manual Differential     Answer:   No       Medications prescribed:  Outpatient Encounter Medications as of 9/17/2020   Medication Sig Dispense Refill   • levothyroxine (SYNTHROID, LEVOTHROID) 25 MCG tablet TAKE 1/2 TABLET BY MOUTH DAILY 15 tablet 11   • Thyroid (ARMOUR THYROID) 30 MG PO tablet TAKE 1 TABLET BY MOUTH AT NIGHT 30 tablet 2   • Thyroid (ARMOUR THYROID) 90 MG PO tablet TAKE 1 TABLET BY MOUTH EVERY MORNING 30 tablet 5     No facility-administered encounter medications on file as of 9/17/2020.      Glycemic Management  prediabetes           Lab Results   Component Value Date     HGBA1C 6.01 (H) 05/01/2019                  discussed weight loss, exercise         Preventive Care:  Patient is not smoking           Weight Related:  Obesity, Recommended weight loss, Discussed weight management strategies        Patient's Body mass index is 54.88 kg/m². BMI is above normal parameters. Recommendations include: nutrition counseling.        Decrease daily caloric intake by 500 calories per day                     Other Diabetes Related Aspects        Hashimoto's thyroiditis                 due to uncontrolled hashimoto's will also check a celiac panel-- negative   complaints of weight gain and increased fatigue,. some facial hair ---celiac panel negative, adrenal workup negative               Component      Latest Ref Rng & Units 9/17/2019   TSH Baseline      0.270 - 4.200 uIU/mL 4.060   Free T4      0.93 - 1.70 ng/dL 0.78 (L)   T3, Total      80.0 - 200.0 ng/dl 136.0               Taking armour 90 mg in the am and 30 mg in the pm     Taking levothyroxine 25 mcg once daily       Lab Results   Component Value Date    TSH 0.742 03/17/2020                Bone Health      Vitamin d def.         Component      Latest Ref Rng & Units 5/1/2019   25 Hydroxy, Vitamin D      30.0 - 100.0 ng/ml 33.9                  Lab Results   Component Value Date     [id number] 05/01/2019             history of iron def    Check iron def.    Zinc def.     Reassess zinc level         4. No follow-ups on file.

## 2020-09-18 LAB — THYROPEROXIDASE AB SERPL-ACNC: 69 IU/ML (ref 0–34)

## 2020-09-22 LAB
T3REVERSE SERPL-MCNC: 14.2 NG/DL (ref 9.2–24.1)
ZINC SERPL-MCNC: 91 UG/DL (ref 56–134)

## 2020-09-23 ENCOUNTER — TELEPHONE (OUTPATIENT)
Dept: ENDOCRINOLOGY | Facility: CLINIC | Age: 45
End: 2020-09-23

## 2020-09-23 NOTE — TELEPHONE ENCOUNTER
Pt aware   ----- Message from TOD Benedr sent at 9/22/2020  8:18 AM CDT -----  Reverse t3 is normal , zinc is normal

## 2020-10-22 DIAGNOSIS — E06.3 HASHIMOTO'S DISEASE: ICD-10-CM

## 2020-10-22 RX ORDER — LEVOTHYROXINE AND LIOTHYRONINE 57; 13.5 UG/1; UG/1
TABLET ORAL
Qty: 30 TABLET | Refills: 5 | Status: SHIPPED | OUTPATIENT
Start: 2020-10-22

## 2020-12-11 ENCOUNTER — LAB (OUTPATIENT)
Dept: LAB | Facility: HOSPITAL | Age: 45
End: 2020-12-11

## 2020-12-11 ENCOUNTER — TRANSCRIBE ORDERS (OUTPATIENT)
Dept: LAB | Facility: HOSPITAL | Age: 45
End: 2020-12-11

## 2020-12-11 DIAGNOSIS — E03.9 HYPOTHYROIDISM, UNSPECIFIED TYPE: Primary | ICD-10-CM

## 2020-12-11 PROCEDURE — 82306 VITAMIN D 25 HYDROXY: CPT

## 2020-12-11 PROCEDURE — 80061 LIPID PANEL: CPT

## 2020-12-11 PROCEDURE — 84443 ASSAY THYROID STIM HORMONE: CPT

## 2020-12-11 PROCEDURE — 83036 HEMOGLOBIN GLYCOSYLATED A1C: CPT

## 2020-12-11 PROCEDURE — 84481 FREE ASSAY (FT-3): CPT

## 2020-12-11 PROCEDURE — 82746 ASSAY OF FOLIC ACID SERUM: CPT

## 2020-12-11 PROCEDURE — 83540 ASSAY OF IRON: CPT

## 2020-12-11 PROCEDURE — 82607 VITAMIN B-12: CPT

## 2020-12-11 PROCEDURE — 84436 ASSAY OF TOTAL THYROXINE: CPT

## 2020-12-11 PROCEDURE — 84466 ASSAY OF TRANSFERRIN: CPT

## 2020-12-11 PROCEDURE — 84482 T3 REVERSE: CPT

## 2020-12-11 PROCEDURE — 80053 COMPREHEN METABOLIC PANEL: CPT

## 2020-12-11 PROCEDURE — 85025 COMPLETE CBC W/AUTO DIFF WBC: CPT

## 2020-12-12 LAB
25(OH)D3 SERPL-MCNC: 45.2 NG/ML (ref 30–100)
ALBUMIN SERPL-MCNC: 4 G/DL (ref 3.5–5.2)
ALBUMIN/GLOB SERPL: 1.3 G/DL
ALP SERPL-CCNC: 84 U/L (ref 39–117)
ALT SERPL W P-5'-P-CCNC: 24 U/L (ref 1–33)
ANION GAP SERPL CALCULATED.3IONS-SCNC: 8.1 MMOL/L (ref 5–15)
AST SERPL-CCNC: 15 U/L (ref 1–32)
BASOPHILS # BLD AUTO: 0.05 10*3/MM3 (ref 0–0.2)
BASOPHILS NFR BLD AUTO: 0.6 % (ref 0–1.5)
BILIRUB SERPL-MCNC: 0.4 MG/DL (ref 0–1.2)
BUN SERPL-MCNC: 13 MG/DL (ref 6–20)
BUN/CREAT SERPL: 22 (ref 7–25)
CALCIUM SPEC-SCNC: 9.1 MG/DL (ref 8.6–10.5)
CHLORIDE SERPL-SCNC: 100 MMOL/L (ref 98–107)
CHOLEST SERPL-MCNC: 185 MG/DL (ref 0–200)
CO2 SERPL-SCNC: 28.9 MMOL/L (ref 22–29)
CREAT SERPL-MCNC: 0.59 MG/DL (ref 0.57–1)
DEPRECATED RDW RBC AUTO: 42.4 FL (ref 37–54)
EOSINOPHIL # BLD AUTO: 0.15 10*3/MM3 (ref 0–0.4)
EOSINOPHIL NFR BLD AUTO: 1.8 % (ref 0.3–6.2)
ERYTHROCYTE [DISTWIDTH] IN BLOOD BY AUTOMATED COUNT: 14.1 % (ref 12.3–15.4)
FOLATE SERPL-MCNC: >20 NG/ML (ref 4.78–24.2)
GFR SERPL CREATININE-BSD FRML MDRD: 111 ML/MIN/1.73
GLOBULIN UR ELPH-MCNC: 3.2 GM/DL
GLUCOSE SERPL-MCNC: 102 MG/DL (ref 65–99)
HBA1C MFR BLD: 6.1 % (ref 4.8–5.6)
HCT VFR BLD AUTO: 37.2 % (ref 34–46.6)
HDLC SERPL-MCNC: 32 MG/DL (ref 40–60)
HGB BLD-MCNC: 12.5 G/DL (ref 12–15.9)
IMM GRANULOCYTES # BLD AUTO: 0.01 10*3/MM3 (ref 0–0.05)
IMM GRANULOCYTES NFR BLD AUTO: 0.1 % (ref 0–0.5)
IRON 24H UR-MRATE: 73 MCG/DL (ref 37–145)
IRON SATN MFR SERPL: 19 % (ref 20–50)
LDLC SERPL CALC-MCNC: 130 MG/DL (ref 0–100)
LDLC/HDLC SERPL: 4 {RATIO}
LYMPHOCYTES # BLD AUTO: 1.89 10*3/MM3 (ref 0.7–3.1)
LYMPHOCYTES NFR BLD AUTO: 22.4 % (ref 19.6–45.3)
MCH RBC QN AUTO: 27.7 PG (ref 26.6–33)
MCHC RBC AUTO-ENTMCNC: 33.6 G/DL (ref 31.5–35.7)
MCV RBC AUTO: 82.5 FL (ref 79–97)
MONOCYTES # BLD AUTO: 0.43 10*3/MM3 (ref 0.1–0.9)
MONOCYTES NFR BLD AUTO: 5.1 % (ref 5–12)
NEUTROPHILS NFR BLD AUTO: 5.92 10*3/MM3 (ref 1.7–7)
NEUTROPHILS NFR BLD AUTO: 70 % (ref 42.7–76)
NRBC BLD AUTO-RTO: 0 /100 WBC (ref 0–0.2)
PLATELET # BLD AUTO: 335 10*3/MM3 (ref 140–450)
PMV BLD AUTO: 11.8 FL (ref 6–12)
POTASSIUM SERPL-SCNC: 4.4 MMOL/L (ref 3.5–5.2)
PROT SERPL-MCNC: 7.2 G/DL (ref 6–8.5)
RBC # BLD AUTO: 4.51 10*6/MM3 (ref 3.77–5.28)
SODIUM SERPL-SCNC: 137 MMOL/L (ref 136–145)
T3FREE SERPL-MCNC: 4.2 PG/ML (ref 2–4.4)
T4 SERPL-MCNC: 5.82 MCG/DL (ref 4.5–11.7)
TIBC SERPL-MCNC: 377 MCG/DL (ref 298–536)
TRANSFERRIN SERPL-MCNC: 253 MG/DL (ref 200–360)
TRIGL SERPL-MCNC: 125 MG/DL (ref 0–150)
TSH SERPL DL<=0.05 MIU/L-ACNC: 1.99 UIU/ML (ref 0.27–4.2)
VIT B12 BLD-MCNC: 623 PG/ML (ref 211–946)
VLDLC SERPL-MCNC: 23 MG/DL (ref 5–40)
WBC # BLD AUTO: 8.45 10*3/MM3 (ref 3.4–10.8)

## 2020-12-13 DIAGNOSIS — E06.3 HASHIMOTO'S DISEASE: ICD-10-CM

## 2020-12-14 RX ORDER — LEVOTHYROXINE AND LIOTHYRONINE 19; 4.5 UG/1; UG/1
TABLET ORAL
Qty: 30 TABLET | Refills: 2 | Status: SHIPPED | OUTPATIENT
Start: 2020-12-14 | End: 2021-03-15

## 2020-12-22 LAB — T3REVERSE SERPL-MCNC: 14.3 NG/DL (ref 9.2–24.1)

## 2021-03-14 DIAGNOSIS — E06.3 HASHIMOTO'S DISEASE: ICD-10-CM

## 2021-03-15 RX ORDER — LEVOTHYROXINE AND LIOTHYRONINE 19; 4.5 UG/1; UG/1
TABLET ORAL
Qty: 30 TABLET | Refills: 2 | Status: SHIPPED | OUTPATIENT
Start: 2021-03-15